# Patient Record
Sex: MALE | Race: BLACK OR AFRICAN AMERICAN | Employment: OTHER | URBAN - METROPOLITAN AREA
[De-identification: names, ages, dates, MRNs, and addresses within clinical notes are randomized per-mention and may not be internally consistent; named-entity substitution may affect disease eponyms.]

---

## 2017-01-26 DIAGNOSIS — E55.9 VITAMIN D DEFICIENCY: Primary | ICD-10-CM

## 2017-01-26 NOTE — TELEPHONE ENCOUNTER
Vitamin D 50,000 IU Caps      Last Written Prescription Date: 9/30/16  Last Fill Quantity: 12,  # refills: 3   Last Office Visit with [facility], P or [facility] prescribing provider: 9/30/16

## 2017-01-27 DIAGNOSIS — E11.39 TYPE 2 DIABETES MELLITUS WITH OTHER DIABETIC OPHTHALMIC COMPLICATION (H): Primary | ICD-10-CM

## 2017-01-27 NOTE — TELEPHONE ENCOUNTER
Contour Next Test Strips 100's      Last Written Prescription Date: 8/24/16  Last Fill Quantity: 1,  # refills: 3   Last Office Visit with [facility], P or [facility] prescribing provider: 9/30/16

## 2017-01-27 NOTE — TELEPHONE ENCOUNTER
Routing refill request to provider for review/approval because:  Drug not on the [facility] refill protocol     Thank you,  Porsche Ortiz RN

## 2017-04-24 ENCOUNTER — TELEPHONE (OUTPATIENT)
Dept: FAMILY MEDICINE | Facility: CLINIC | Age: 67
End: 2017-04-24

## 2017-04-24 NOTE — TELEPHONE ENCOUNTER
Call Regarding Preventive Health Screening Colonoscopy    Attempt 1    Message on voicemail     Comments:       Outreach   Ilana Appiah

## 2017-05-01 NOTE — TELEPHONE ENCOUNTER
"05/01/17      [facility] completed else where MARICHUY Jamil.    Nell \"Мария\" Rocky  Central Scheduler    "

## 2017-09-01 ENCOUNTER — TRANSFERRED RECORDS (OUTPATIENT)
Dept: HEALTH INFORMATION MANAGEMENT | Facility: CLINIC | Age: 67
End: 2017-09-01

## 2017-10-03 ENCOUNTER — OFFICE VISIT (OUTPATIENT)
Dept: FAMILY MEDICINE | Facility: CLINIC | Age: 67
End: 2017-10-03
Payer: MEDICARE

## 2017-10-03 VITALS
HEIGHT: 67 IN | TEMPERATURE: 97.2 F | BODY MASS INDEX: 24.34 KG/M2 | DIASTOLIC BLOOD PRESSURE: 83 MMHG | HEART RATE: 81 BPM | SYSTOLIC BLOOD PRESSURE: 133 MMHG | OXYGEN SATURATION: 99 % | WEIGHT: 155.1 LBS

## 2017-10-03 DIAGNOSIS — E11.9 CONTROLLED TYPE 2 DIABETES MELLITUS WITHOUT COMPLICATION, WITHOUT LONG-TERM CURRENT USE OF INSULIN (H): ICD-10-CM

## 2017-10-03 DIAGNOSIS — Z13.89 SCREENING FOR DIABETIC PERIPHERAL NEUROPATHY: ICD-10-CM

## 2017-10-03 DIAGNOSIS — Z00.00 MEDICARE ANNUAL WELLNESS VISIT, SUBSEQUENT: Primary | ICD-10-CM

## 2017-10-03 DIAGNOSIS — I10 ESSENTIAL HYPERTENSION WITH GOAL BLOOD PRESSURE LESS THAN 140/90: ICD-10-CM

## 2017-10-03 LAB — HBA1C MFR BLD: 6.7 % (ref 4.3–6)

## 2017-10-03 PROCEDURE — G0439 PPPS, SUBSEQ VISIT: HCPCS | Performed by: FAMILY MEDICINE

## 2017-10-03 PROCEDURE — 36415 COLL VENOUS BLD VENIPUNCTURE: CPT | Performed by: FAMILY MEDICINE

## 2017-10-03 PROCEDURE — 82043 UR ALBUMIN QUANTITATIVE: CPT | Performed by: FAMILY MEDICINE

## 2017-10-03 PROCEDURE — 83036 HEMOGLOBIN GLYCOSYLATED A1C: CPT | Performed by: FAMILY MEDICINE

## 2017-10-03 PROCEDURE — 80053 COMPREHEN METABOLIC PANEL: CPT | Performed by: FAMILY MEDICINE

## 2017-10-03 PROCEDURE — 99207 C FOOT EXAM  NO CHARGE: CPT | Performed by: FAMILY MEDICINE

## 2017-10-03 PROCEDURE — 80061 LIPID PANEL: CPT | Performed by: FAMILY MEDICINE

## 2017-10-03 NOTE — PROGRESS NOTES
SUBJECTIVE:   Alec Hanson is a 67 year old male who presents for Preventive Visit.    Are you in the first 12 months of your Medicare Part B coverage?  No    Healthy Habits:    Do you get at least three servings of calcium containing foods daily (dairy, green leafy vegetables, etc.)? no, taking calcium and/or vitamin D supplement: no    Amount of exercise or daily activities, outside of work: 0 day(s) per week x3 months     Problems taking medications regularly not applicable    Medication side effects: No    Have you had an eye exam in the past two years? Yes 9/5/2017    Do you see a dentist twice per year? yes    Do you have sleep apnea, excessive snoring or daytime drowsiness?no- has trouble sleeping at night     COGNITIVE SCREEN  1) Repeat 3 items (Banana, Sunrise, Chair)    2) Clock draw: NORMAL  3) 3 item recall: Recalls 3 objects  Results: 3 items recalled: COGNITIVE IMPAIRMENT LESS LIKELY    Mini-CogTM Copyright S Jorje. Licensed by the author for use in [facility]; reprinted with permission (thong@[facility]). All rights reserved.          Eye exam with ophthalmology on this date: 9/5/2017          Diabetes Follow-up      Patient is checking blood sugars: not at all    Diabetic concerns: None     Symptoms of hypoglycemia (low blood sugar): none     Paresthesias (numbness or burning in feet) or sores: No     Date of last diabetic eye exam: 9/2017    Hyperlipidemia Follow-Up      Rate your low fat/cholesterol diet?: good    Taking statin?  No, he declined     Other lipid medications/supplements?:  none    Hypertension Follow-up      Outpatient blood pressures are not being checked.    Low Salt Diet: no added salt              Reviewed and updated as needed this visit by clinical staffTobacco  Allergies  Meds  Problems         Reviewed and updated as needed this visit by Provider  Tobacco  Allergies  Meds  Problems        Social History   Substance Use Topics     Smoking status: Never  Smoker     Smokeless tobacco: Never Used     Alcohol use No       The patient does not drink >3 drinks per day nor >7 drinks per week.    Today's PHQ-2 Score:   PHQ-2 ( 1999 [employer]) 10/3/2017 9/30/2016   Q1: Little interest or pleasure in doing things 0 0   Q2: Feeling down, depressed or hopeless 0 0   PHQ-2 Score 0 0         Do you feel safe in your environment - Yes    Do you have a Health Care Directive?: No: Advance care planning was reviewed with patient; patient declined at this time.      Current providers sharing in care for this patient include: Patient Care Team:  Riaz Torres MD as PCP - General (Family Practice)  Yael Hanks MD as MD (Ophthalmology)  Riaz Torres MD as MD (Family Practice)  Aravind Gloria MD as MD (Neurology)      Hearing impairment: Yes    Ability to successfully perform activities of daily living: Yes, no assistance needed     Fall risk:      Home safety:  lack of grab bars in the bathroom      The following health maintenance items are reviewed in Epic and correct as of today:  Health Maintenance   Topic Date Due     ADVANCE DIRECTIVE PLANNING Q5 YRS  09/10/2003     AORTIC ANEURYSM SCREENING (SYSTEM ASSIGNED)  09/10/2013     A1C Q6 MO  03/30/2017     EYE EXAM Q1 YEAR  06/01/2017     INFLUENZA VACCINE (SYSTEM ASSIGNED)  09/01/2017     FOOT EXAM Q1 YEAR  09/30/2017     CREATININE Q1 YEAR  09/30/2017     LIPID MONITORING Q1 YEAR  09/30/2017     MICROALBUMIN Q1 YEAR  09/30/2017     FALL RISK ASSESSMENT  09/30/2017     TSH W/ FREE T4 REFLEX Q2 YEAR  09/30/2018     TETANUS IMMUNIZATION (SYSTEM ASSIGNED)  07/31/2024     COLON CANCER SCREEN (SYSTEM ASSIGNED)  01/01/2026     PNEUMOCOCCAL  Completed     HEPATITIS C SCREENING  Completed     Labs reviewed in EPIC    Got flu shot elsewhere    ROS:  Constitutional, HEENT, cardiovascular, pulmonary, gi and gu systems are negative, except as otherwise noted.      OBJECTIVE:   /83  Pulse 81   "Temp 97.2  F (36.2  C) (Oral)  Ht 5' 7.25\" (1.708 m)  Wt 155 lb 1.6 oz (70.4 kg)  SpO2 99%  BMI 24.11 kg/m2 Estimated body mass index is 24.11 kg/(m^2) as calculated from the following:    Height as of this encounter: 5' 7.25\" (1.708 m).    Weight as of this encounter: 155 lb 1.6 oz (70.4 kg).  EXAM:   GENERAL: healthy, alert and no distress  EYES: Eyes grossly normal to inspection, PERRL and conjunctivae and sclerae normal  HENT: ear canals and TM's normal, nose and mouth without ulcers or lesions  NECK: no adenopathy, no asymmetry, masses, or scars and thyroid normal to palpation  RESP: lungs clear to auscultation - no rales, rhonchi or wheezes  CV: regular rate and rhythm, normal S1 S2, no S3 or S4, no murmur, click or rub, no peripheral edema and peripheral pulses strong  ABDOMEN: soft, nontender, no hepatosplenomegaly, no masses and bowel sounds normal   (male): normal male genitalia without lesions or urethral discharge, no hernia  RECTAL (male): normal sphincter tone, no rectal masses, prostate normal size, smooth, nontender without nodules or masses  MS: no gross musculoskeletal defects noted, no edema  NEURO: Normal strength and tone, mentation intact and speech normal  PSYCH: mentation appears normal, affect normal/bright  LYMPH: no cervical, supraclavicular, axillary, or inguinal adenopathy  Diabetic foot exam: normal DP and PT pulses, no trophic changes or ulcerative lesions and normal sensory exam    ASSESSMENT / PLAN:   1. Medicare annual wellness visit, subsequent  Doing well    2. Controlled type 2 diabetes mellitus without complication, without long-term current use of insulin (H)  Well controlled [facility] diet/ exercise  - Albumin Random Urine Quantitative with Creat Ratio  - Comprehensive metabolic panel    3. Screening for diabetic peripheral neuropathy  normal   - FOOT EXAM  NO CHARGE [88721.642]    4. Essential hypertension with goal blood pressure less than 140/90  Well controlled  Off " "meds  - Lipid panel reflex to direct LDL  - Albumin Random Urine Quantitative with Creat Ratio  - Comprehensive metabolic panel    End of Life Planning:  Patient currently has an advanced directive: No.  I have verified the patient's ablity to prepare an advanced directive/make health care decisions.  Literature was provided to assist patient in preparing an advanced directive.    COUNSELING:  Reviewed preventive health counseling, as reflected in patient instructions       Regular exercise       Healthy diet/nutrition       Vision screening       Hearing screening       Dental care       Colon cancer screening       Prostate cancer screening he declined PSA        Estimated body mass index is 24.11 kg/(m^2) as calculated from the following:    Height as of this encounter: 5' 7.25\" (1.708 m).    Weight as of this encounter: 155 lb 1.6 oz (70.4 kg).     reports that he has never smoked. He has never used smokeless tobacco.        Appropriate preventive services were discussed with this patient, including applicable screening as appropriate for cardiovascular disease, diabetes, osteopenia/osteoporosis, and glaucoma.  As appropriate for age/gender, discussed screening for colorectal cancer, prostate cancer, breast cancer, and cervical cancer. Checklist reviewing preventive services available has been given to the patient.    Reviewed patients plan of care and provided an AVS. The Basic Care Plan (routine screening as documented in Health Maintenance) for Alec meets the Care Plan requirement. This Care Plan has been established and reviewed with the Patient.    Counseling Resources:  ATP IV Guidelines  Pooled Cohorts Equation Calculator  Breast Cancer Risk Calculator  FRAX Risk Assessment  ICSI Preventive Guidelines  Dietary Guidelines for Americans, 2010  USDA's MyPlate  ASA Prophylaxis  Lung CA Screening    Riza Torres MD  [facility]  "

## 2017-10-03 NOTE — MR AVS SNAPSHOT
After Visit Summary   10/3/2017    Alec Hanson    MRN: [id number]           Patient Information     Date Of Birth          1950        Visit Information        Provider Department      10/3/2017 8:30 AM Riaz Torres MD [facility]        Today's Diagnoses     Medicare annual wellness visit, subsequent    -  1    Controlled type 2 diabetes mellitus without complication, without long-term current use of insulin (H)        Screening for diabetic peripheral neuropathy        Essential hypertension with goal blood pressure less than 140/90          Care Instructions      Preventive Health Recommendations:       Male Ages 65 and over    Yearly exam:             See your health care provider every year in order to  o   Review health changes.   o   Discuss preventive care.    o   Review your medicines if your doctor has prescribed any.    Talk with your health care provider about whether you should have a test to screen for prostate cancer (PSA).    Every 3 years, have a diabetes test (fasting glucose). If you are at risk for diabetes, you should have this test more often.    Every 5 years, have a cholesterol test. Have this test more often if you are at risk for high cholesterol or heart disease.     Every 10 years, have a colonoscopy. Or, have a yearly FIT test (stool test). These exams will check for colon cancer.    Talk to with your health care provider about screening for Abdominal Aortic Aneurysm if you have a family history of AAA or have a history of smoking.  Shots:     Get a flu shot each year.     Get a tetanus shot every 10 years.     Talk to your doctor about your pneumonia vaccines. There are now two you should receive - Pneumovax (PPSV 23) and Prevnar (PCV 13).    Talk to your doctor about a shingles vaccine.     Talk to your doctor about the hepatitis B vaccine.  Nutrition:     Eat at least 5 servings of fruits and vegetables each day.     Eat whole-grain  bread, whole-wheat pasta and brown rice instead of white grains and rice.     Talk to your doctor about Calcium and Vitamin D.   Lifestyle    Exercise for at least 150 minutes a week (30 minutes a day, 5 days a week). This will help you control your weight and prevent disease.     Limit alcohol to one drink per day.     No smoking.     Wear sunscreen to prevent skin cancer.     See your dentist every six months for an exam and cleaning.     See your eye doctor every 1 to 2 years to screen for conditions such as glaucoma, macular degeneration and cataracts.          Follow-ups after your visit        Follow-up notes from your care team     Return in about 6 months (around 4/3/2018).      Who to contact     If you have questions or need follow up information about today's clinic visit or your schedule please contact [facility] directly at [phone].  Normal or non-critical lab and imaging results will be communicated to you by Win [employer] Slotshart, letter or phone within 4 business days after the clinic has received the results. If you do not hear from us within 7 days, please contact the clinic through [employer] Sl[employer]art or phone. If you have a critical or abnormal lab result, we will notify you by phone as soon as possible.  Submit refill requests through [email or web address] or call your pharmacy and they will forward the refill request to us. Please allow 3 business days for your refill to be completed.          Additional Information About Your Visit        [email or web address] Information     [email or web address] gives you secure access to your electronic health record. If you see a primary care provider, you can also send messages to your care team and make appointments. If you have questions, please call your primary care clinic.  If you do not have a primary care provider, please call [phone] and they will assist you.        Care EveryWhere ID     This is your Care EveryWhere ID. This could be used by other organizations to access your [facility]  "medical records  [id number]        Your Vitals Were     Pulse Temperature Height Pulse Oximetry BMI (Body Mass Index)       81 97.2  F (36.2  C) (Oral) 5' 7.25\" (1.708 m) 99% 24.11 kg/m2        Blood Pressure from Last 3 Encounters:   10/03/17 133/83   09/30/16 137/72   09/28/15 139/74    Weight from Last 3 Encounters:   10/03/17 155 lb 1.6 oz (70.4 kg)   09/30/16 155 lb 1.6 oz (70.4 kg)   09/28/15 151 lb (68.5 kg)              We Performed the Following     Albumin Random Urine Quantitative with Creat Ratio     Comprehensive metabolic panel     FOOT EXAM  NO CHARGE [25829.114]     HEMOGLOBIN A1C     Lipid panel reflex to direct LDL          Today's Medication Changes          These changes are accurate as of: 10/3/17  9:05 AM.  If you have any questions, ask your nurse or doctor.               Stop taking these medicines if you haven't already. Please contact your care team if you have questions.     gabapentin 300 MG capsule   Commonly known as:  NEURONTIN   Stopped by:  Riaz Torres MD           isoniazid 300 MG tablet   Commonly known as:  NYDRAZID   Stopped by:  Riaz Torres MD           mefloquine 250 MG tablet   Commonly known as:  LARIAM   Stopped by:  Riaz Torres MD           naproxen 500 MG tablet   Commonly known as:  NAPROSYN   Stopped by:  Riaz Torres MD           traZODone 50 MG tablet   Commonly known as:  DESYREL   Stopped by:  Riaz Torres MD           typhoid CR capsule   Commonly known as:  VIVOTIF REI VACCINE   Stopped by:  Riaz Torres MD                    Primary Care Provider Office Phone # Fax #    Riaz Torres [phone]       [address] [facility] [address]  [facility] [address]        Equal Access to Services     [facility] ROSANNE AH: Hadii hemalatha ku hadasho Soomaali, waaxda luqadaha, qaybta kaalmada adeegyada, waxay zhanna caldera. So [facility] [phone].    ATENCIÓN: Si " kelly soria, tiene a davila disposición servicios gratuitos de asistencia lingüística. Panchito mckeon [phone].    We comply with applicable federal civil rights laws and [place] laws. We do not discriminate on the basis of race, color, national origin, age, disability, sex, sexual orientation, or gender identity.            Thank you!     Thank you for [place] [facility]  [place]. Our goal is always to provide you with excellent care. Hearing back from our patients is one way we can continue to improve our services. Please take a few minutes to complete the written survey that you may receive in the mail after your visit with us. Thank you!             Your Updated Medication List - Protect others around you: Learn how to safely use, store and throw away your medicines at www.disposemymeds.org.          This list is accurate as of: 10/3/17  9:05 AM.  Always use your most recent med list.                   Brand Name Dispense Instructions for use Diagnosis    amLODIPine 5 MG tablet    NORVASC    90 tablet    Take 1 tablet (5 mg) by mouth daily    Essential hypertension with goal blood pressure less than 140/90       aspirin 81 MG tablet     90 tablet    Take 1 tablet (81 mg) by mouth daily        blood glucose monitoring lancets     1 Box    Use to test blood sugar 2 times daily or as directed.    Type 2 diabetes mellitus with other diabetic ophthalmic complication       blood glucose monitoring test strip    MIKE CONTOUR NEXT    1 Box    Use to test blood sugar 2 times daily or as directed.    Type 2 diabetes mellitus with other diabetic ophthalmic complication       cholecalciferol 88167 UNITS capsule    VITAMIN D3    12 capsule    Take 1 capsule (50,000 Units) by mouth once a week    Vitamin D deficiency       losartan 25 MG tablet    COZAAR    45 tablet    Take 0.5 tablets (12.5 mg) by mouth daily    Essential hypertension with goal blood pressure less than 140/90, Type 2 diabetes mellitus  with other diabetic ophthalmic complication       pravastatin 10 MG tablet    PRAVACHOL    90 tablet    Take 1 tablet (10 mg) by mouth daily    Type 2 diabetes mellitus with other diabetic ophthalmic complication

## 2017-10-03 NOTE — NURSING NOTE
"Chief Complaint   Patient presents with     Medicare Visit     Diabetes       Initial /90 (BP Location: Right arm, Patient Position: Chair, Cuff Size: Adult Regular)  Pulse 81  Temp 97.2  F (36.2  C) (Oral)  Ht 5' 7.25\" (1.708 m)  Wt 155 lb 1.6 oz (70.4 kg)  SpO2 99%  BMI 24.11 kg/m2 Estimated body mass index is 24.11 kg/(m^2) as calculated from the following:    Height as of this encounter: 5' 7.25\" (1.708 m).    Weight as of this encounter: 155 lb 1.6 oz (70.4 kg).  Medication Reconciliation: complete     Hyun Harden CMA    "

## 2017-10-04 LAB
ALBUMIN SERPL-MCNC: 4.3 G/DL (ref 3.4–5)
ALP SERPL-CCNC: 53 U/L (ref 40–150)
ALT SERPL W P-5'-P-CCNC: 28 U/L (ref 0–70)
ANION GAP SERPL CALCULATED.3IONS-SCNC: 9 MMOL/L (ref 3–14)
AST SERPL W P-5'-P-CCNC: 20 U/L (ref 0–45)
BILIRUB SERPL-MCNC: 0.7 MG/DL (ref 0.2–1.3)
BUN SERPL-MCNC: 14 MG/DL (ref 7–30)
CALCIUM SERPL-MCNC: 9.6 MG/DL (ref 8.5–10.1)
CHLORIDE SERPL-SCNC: 101 MMOL/L (ref 94–109)
CHOLEST SERPL-MCNC: 268 MG/DL
CO2 SERPL-SCNC: 26 MMOL/L (ref 20–32)
CREAT SERPL-MCNC: 1.18 MG/DL (ref 0.66–1.25)
CREAT UR-MCNC: 133 MG/DL
GFR SERPL CREATININE-BSD FRML MDRD: 61 ML/MIN/1.7M2
GLUCOSE SERPL-MCNC: 138 MG/DL (ref 70–99)
HDLC SERPL-MCNC: 42 MG/DL
LDLC SERPL CALC-MCNC: 169 MG/DL
MICROALBUMIN UR-MCNC: <5 MG/L
MICROALBUMIN/CREAT UR: NORMAL MG/G CR (ref 0–17)
NONHDLC SERPL-MCNC: 226 MG/DL
POTASSIUM SERPL-SCNC: 4.2 MMOL/L (ref 3.4–5.3)
PROT SERPL-MCNC: 8.5 G/DL (ref 6.8–8.8)
SODIUM SERPL-SCNC: 136 MMOL/L (ref 133–144)
TRIGL SERPL-MCNC: 285 MG/DL

## 2018-04-10 ENCOUNTER — TELEPHONE (OUTPATIENT)
Dept: FAMILY MEDICINE | Facility: CLINIC | Age: 68
End: 2018-04-10

## 2018-04-10 DIAGNOSIS — E11.9 TYPE 2 DIABETES MELLITUS WITHOUT COMPLICATION, WITHOUT LONG-TERM CURRENT USE OF INSULIN (H): Primary | ICD-10-CM

## 2018-04-10 DIAGNOSIS — E55.9 VITAMIN D DEFICIENCY: ICD-10-CM

## 2018-04-10 NOTE — TELEPHONE ENCOUNTER
Dr. Torres    See pt message    Spoke with pt and he has appointment scheduled with you for the pain and DM check on 4/17    Labs cued, are there others you wish    Linda Domínguez RN   [facility]

## 2018-04-10 NOTE — TELEPHONE ENCOUNTER
"Alec called this morning to set up an appointment with Dr. Torres as he is having pain in his \"bones\" and wanted to come in earlier for a fasting A1C. Does he also need a follow up on his diabetes? Please call him at [phone].  "

## 2018-04-16 NOTE — PROGRESS NOTES
SUBJECTIVE:   Alec Hanson is a 67 year old male who presents to clinic today for the following health issues:      Diabetes Follow-up      Patient is checking blood sugars: not at all    Diabetic concerns: None     Symptoms of hypoglycemia (low blood sugar): none     Paresthesias (numbness or burning in feet) or sores: No         BP Readings from Last 2 Encounters:   10/03/17 133/83   09/30/16 137/72     Hemoglobin A1C (%)   Date Value   10/03/2017 6.7 (H)   09/30/2016 6.4 (H)     LDL Cholesterol Calculated (mg/dL)   Date Value   10/03/2017 169 (H)   09/30/2016 102 (H)       Amount of exercise or physical activity: 2-3 days/week for an average of 30-45 minutes    Problems taking medications regularly: he stopped them     got muscle pain on ststin    Medication side effects: muscle aches    Diet: regular (no restrictions)      Other questions/oncerns: Had a sharp pain in head that woke him up from his sleep on 4/9 around 1130pm- Gasping for air, dizziness/lightheadedness, off balance, fatigue     Hyperlipidemia Follow-Up      Rate your low fat/cholesterol diet?: good    Taking statin?  Stopped due to muscle pain    Other lipid medications/supplements?:  none    Hypertension Follow-up      Outpatient blood pressures are not being checked.    Low Salt Diet: no added salt      Problem list and histories reviewed & adjusted, as indicated.  Additional history: as documented    Labs reviewed in EPIC    Reviewed and updated as needed this visit by clinical staff       Reviewed and updated as needed this visit by Provider         ROS:  Constitutional, HEENT, cardiovascular, pulmonary, gi and gu systems are negative, except as otherwise noted.    OBJECTIVE:     /84  Pulse 67  Temp 97.7  F (36.5  C) (Oral)  Wt 154 lb 3.2 oz (69.9 kg)  SpO2 99%  BMI 23.97 kg/m2  Body mass index is 23.97 kg/(m^2).  GENERAL: healthy, alert and no distress  NECK: no adenopathy, no asymmetry, masses, or scars and thyroid normal to  palpation  RESP: lungs clear to auscultation - no rales, rhonchi or wheezes  CV: regular rate and rhythm, normal S1 S2, no S3 or S4, no murmur, click or rub, no peripheral edema and peripheral pulses strong  ABDOMEN: soft, nontender, no hepatosplenomegaly, no masses and bowel sounds normal  MS: no gross musculoskeletal defects noted, no edema  SKIN: no suspicious lesions or rashes  PSYCH: mentation appears normal, affect normal/bright  Diabetic foot exam: normal DP and PT pulses, no trophic changes or ulcerative lesions and normal sensory exam  Neuro exam normal   Diagnostic Test Results:  Results for orders placed or performed in visit on 04/17/18   HEMOGLOBIN A1C   Result Value Ref Range    Hemoglobin A1C 6.6 (H) 0 - 5.6 %   EKG normal     ASSESSMENT/PLAN:             1. Type 2 diabetes mellitus without complication, without long-term current use of insulin (H)  Well controlled with diet/ exercise  - HEMOGLOBIN A1C  - **Comprehensive metabolic panel FUTURE 2mo  - Albumin Random Urine Quantitative with Creat Ratio    2. Vitamin D deficiency  recheck  - **Vitamin D Deficiency FUTURE anytime    3. Essential hypertension with goal blood pressure less than 140/90  Not controlled, likely leading to [place]   resume medications   - amLODIPine (NORVASC) 5 MG tablet; Take 1 tablet (5 mg) by mouth daily  Dispense: 90 tablet; Refill: 3  - losartan (COZAAR) 25 MG tablet; Take 0.5 tablets (12.5 mg) by mouth daily  Dispense: 45 tablet; Refill: 1  Take asa  Regular exercise  Follow up in 1 month.     Riaz Torres MD  [facility] [place]

## 2018-04-17 ENCOUNTER — OFFICE VISIT (OUTPATIENT)
Dept: FAMILY MEDICINE | Facility: CLINIC | Age: 68
End: 2018-04-17
Payer: MEDICARE

## 2018-04-17 ENCOUNTER — TRANSFERRED RECORDS (OUTPATIENT)
Dept: HEALTH INFORMATION MANAGEMENT | Facility: CLINIC | Age: 68
End: 2018-04-17

## 2018-04-17 VITALS
TEMPERATURE: 97.7 F | DIASTOLIC BLOOD PRESSURE: 84 MMHG | OXYGEN SATURATION: 99 % | BODY MASS INDEX: 23.97 KG/M2 | SYSTOLIC BLOOD PRESSURE: 156 MMHG | WEIGHT: 154.2 LBS | HEART RATE: 67 BPM

## 2018-04-17 DIAGNOSIS — E11.9 TYPE 2 DIABETES MELLITUS WITHOUT COMPLICATION, WITHOUT LONG-TERM CURRENT USE OF INSULIN (H): ICD-10-CM

## 2018-04-17 DIAGNOSIS — I10 ESSENTIAL HYPERTENSION WITH GOAL BLOOD PRESSURE LESS THAN 140/90: Primary | ICD-10-CM

## 2018-04-17 DIAGNOSIS — E55.9 VITAMIN D DEFICIENCY: ICD-10-CM

## 2018-04-17 LAB — HBA1C MFR BLD: 6.6 % (ref 0–5.6)

## 2018-04-17 PROCEDURE — 82306 VITAMIN D 25 HYDROXY: CPT | Performed by: FAMILY MEDICINE

## 2018-04-17 PROCEDURE — 82043 UR ALBUMIN QUANTITATIVE: CPT | Performed by: FAMILY MEDICINE

## 2018-04-17 PROCEDURE — 93000 ELECTROCARDIOGRAM COMPLETE: CPT | Performed by: FAMILY MEDICINE

## 2018-04-17 PROCEDURE — 80053 COMPREHEN METABOLIC PANEL: CPT | Performed by: FAMILY MEDICINE

## 2018-04-17 PROCEDURE — 99214 OFFICE O/P EST MOD 30 MIN: CPT | Performed by: FAMILY MEDICINE

## 2018-04-17 PROCEDURE — 99207 C FOOT EXAM  NO CHARGE: CPT | Performed by: FAMILY MEDICINE

## 2018-04-17 PROCEDURE — 83036 HEMOGLOBIN GLYCOSYLATED A1C: CPT | Performed by: FAMILY MEDICINE

## 2018-04-17 PROCEDURE — 36415 COLL VENOUS BLD VENIPUNCTURE: CPT | Performed by: FAMILY MEDICINE

## 2018-04-17 RX ORDER — AMLODIPINE BESYLATE 5 MG/1
5 TABLET ORAL DAILY
Qty: 90 TABLET | Refills: 3 | Status: SHIPPED | OUTPATIENT
Start: 2018-04-17 | End: 2019-07-05

## 2018-04-17 RX ORDER — LOSARTAN POTASSIUM 25 MG/1
12.5 TABLET ORAL DAILY
Qty: 45 TABLET | Refills: 1 | Status: SHIPPED | OUTPATIENT
Start: 2018-04-17 | End: 2018-06-08

## 2018-04-17 NOTE — MR AVS SNAPSHOT
After Visit Summary   4/17/2018    Alec Hanson    MRN: [id number]           Patient Information     Date Of Birth          1950        Visit Information        Provider Department      4/17/2018 2:30 PM iRaz Torres MD [facility] [place]        Today's Diagnoses     Essential hypertension with goal blood pressure less than 140/90    -  1    Type 2 diabetes mellitus without complication, without long-term current use of insulin (H)        Vitamin D deficiency           Follow-ups after your visit        Who to contact     If you have questions or need follow up information about today's clinic visit or your schedule please contact [facility] directly at [phone].  Normal or non-critical lab and imaging results will be communicated to you by MyChart, letter or phone within 4 business days after the clinic has received the results. If you do not hear from us within 7 days, please contact the clinic through [employer]el Mail[employer] or phone. If you have a critical or abnormal lab result, we will notify you by phone as soon as possible.  Submit refill requests through righTune or call your pharmacy and they will forward the refill request to us. Please allow 3 business days for your refill to be completed.          Additional Information About Your Visit        MyChart Information     righTune gives you secure access to your electronic health record. If you see a primary care provider, you can also send messages to your care team and make appointments. If you have questions, please call your primary care clinic.  If you do not have a primary care provider, please call [phone] and they will assist you.        Care EveryWhere ID     This is your Care EveryWhere ID. This could be used by other organizations to access your [place] medical records  [id number]        Your Vitals Were     Pulse Temperature Pulse Oximetry BMI (Body Mass Index)          67 97.7  F (36.5  C) (Oral)  99% 23.97 kg/m2         Blood Pressure from Last 3 Encounters:   04/17/18 156/84   10/03/17 133/83   09/30/16 137/72    Weight from Last 3 Encounters:   04/17/18 154 lb 3.2 oz (69.9 kg)   10/03/17 155 lb 1.6 oz (70.4 kg)   09/30/16 155 lb 1.6 oz (70.4 kg)              We Performed the Following     **Comprehensive metabolic panel FUTURE 2mo     **Vitamin D Deficiency FUTURE anytime     Albumin Random Urine Quantitative with Creat Ratio     EKG 12-lead complete w/read - Clinics     FOOT EXAM     HEMOGLOBIN A1C          Where to get your medicines      These medications were sent to [email or web address] [address] [address] [address]     Phone:  [phone]     amLODIPine 5 MG tablet    losartan 25 MG tablet          Primary Care Provider Office Phone # Fax #    Riaz Giovany Torres [phone]       [address] [facility] [address]  [facility] [address]        Equal Access to Services     [facility] : Hadii aad ku hadasho Soomaali, waaxda luqadaha, qaybta kaalmada adegeraldyarom, emma kent . So [facility] [phone].    ATENCIÓN: Si habla español, tiene a davila disposición servicios gratuitos de asistencia lingüística. Panchito al [phone].    We comply with applicable federal civil rights laws and [place] laws. We do not discriminate on the basis of race, color, national origin, age, disability, sex, sexual orientation, or gender identity.            Thank you!     Thank you for choosing [facility]  for your care. Our goal is always to provide you with excellent care. Hearing back from our patients is one way we can continue to improve our services. Please take a few minutes to complete the written survey that you may receive in the mail after your visit with us. Thank you!             Your Updated Medication List - Protect others around you: Learn how to safely use, store and throw away your  medicines at www.disposemymeds.org.          This list is accurate as of 4/17/18  4:34 PM.  Always use your most recent med list.                   Brand Name Dispense Instructions for use Diagnosis    amLODIPine 5 MG tablet    NORVASC    90 tablet    Take 1 tablet (5 mg) by mouth daily    Essential hypertension with goal blood pressure less than 140/90       aspirin 81 MG tablet     90 tablet    Take 1 tablet (81 mg) by mouth daily        blood glucose monitoring lancets     1 Box    Use to test blood sugar 2 times daily or as directed.    Type 2 diabetes mellitus with other diabetic ophthalmic complication       blood glucose monitoring test strip    MIKE CONTOUR NEXT    1 Box    Use to test blood sugar 2 times daily or as directed.    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

## 2018-04-18 LAB
ALBUMIN SERPL-MCNC: 4 G/DL (ref 3.4–5)
ALP SERPL-CCNC: 54 U/L (ref 40–150)
ALT SERPL W P-5'-P-CCNC: 27 U/L (ref 0–70)
ANION GAP SERPL CALCULATED.3IONS-SCNC: 2 MMOL/L (ref 3–14)
AST SERPL W P-5'-P-CCNC: 17 U/L (ref 0–45)
BILIRUB SERPL-MCNC: 0.6 MG/DL (ref 0.2–1.3)
BUN SERPL-MCNC: 9 MG/DL (ref 7–30)
CALCIUM SERPL-MCNC: 9.1 MG/DL (ref 8.5–10.1)
CHLORIDE SERPL-SCNC: 104 MMOL/L (ref 94–109)
CO2 SERPL-SCNC: 31 MMOL/L (ref 20–32)
CREAT SERPL-MCNC: 1 MG/DL (ref 0.66–1.25)
DEPRECATED CALCIDIOL+CALCIFEROL SERPL-MC: 53 UG/L (ref 20–75)
GFR SERPL CREATININE-BSD FRML MDRD: 74 ML/MIN/1.7M2
GLUCOSE SERPL-MCNC: 127 MG/DL (ref 70–99)
POTASSIUM SERPL-SCNC: 4.8 MMOL/L (ref 3.4–5.3)
PROT SERPL-MCNC: 7.7 G/DL (ref 6.8–8.8)
SODIUM SERPL-SCNC: 137 MMOL/L (ref 133–144)

## 2018-04-19 LAB
CREAT UR-MCNC: 48 MG/DL
MICROALBUMIN UR-MCNC: <5 MG/L
MICROALBUMIN/CREAT UR: NORMAL MG/G CR (ref 0–17)

## 2018-06-08 ENCOUNTER — OFFICE VISIT (OUTPATIENT)
Dept: FAMILY MEDICINE | Facility: CLINIC | Age: 68
End: 2018-06-08
Payer: MEDICARE

## 2018-06-08 VITALS
OXYGEN SATURATION: 99 % | BODY MASS INDEX: 24.25 KG/M2 | TEMPERATURE: 97.8 F | HEART RATE: 70 BPM | DIASTOLIC BLOOD PRESSURE: 76 MMHG | SYSTOLIC BLOOD PRESSURE: 143 MMHG | RESPIRATION RATE: 16 BRPM | WEIGHT: 156 LBS

## 2018-06-08 DIAGNOSIS — J06.9 VIRAL URI WITH COUGH: ICD-10-CM

## 2018-06-08 DIAGNOSIS — R35.0 URINARY FREQUENCY: ICD-10-CM

## 2018-06-08 DIAGNOSIS — Z12.5 ENCOUNTER FOR SCREENING FOR MALIGNANT NEOPLASM OF PROSTATE: ICD-10-CM

## 2018-06-08 DIAGNOSIS — I10 ESSENTIAL HYPERTENSION WITH GOAL BLOOD PRESSURE LESS THAN 140/90: Primary | ICD-10-CM

## 2018-06-08 DIAGNOSIS — E11.9 CONTROLLED TYPE 2 DIABETES MELLITUS WITHOUT COMPLICATION, WITHOUT LONG-TERM CURRENT USE OF INSULIN (H): ICD-10-CM

## 2018-06-08 LAB
ALBUMIN UR-MCNC: NEGATIVE MG/DL
APPEARANCE UR: CLEAR
BILIRUB UR QL STRIP: NEGATIVE
COLOR UR AUTO: YELLOW
GLUCOSE UR STRIP-MCNC: NEGATIVE MG/DL
HGB UR QL STRIP: NEGATIVE
KETONES UR STRIP-MCNC: NEGATIVE MG/DL
LEUKOCYTE ESTERASE UR QL STRIP: NEGATIVE
NITRATE UR QL: NEGATIVE
PH UR STRIP: 6.5 PH (ref 5–7)
PSA SERPL-ACNC: 0.64 UG/L (ref 0–4)
SOURCE: NORMAL
SP GR UR STRIP: <=1.005 (ref 1–1.03)
TSH SERPL DL<=0.005 MIU/L-ACNC: 2.55 MU/L (ref 0.4–4)
UROBILINOGEN UR STRIP-ACNC: 0.2 EU/DL (ref 0.2–1)

## 2018-06-08 PROCEDURE — 36415 COLL VENOUS BLD VENIPUNCTURE: CPT | Performed by: FAMILY MEDICINE

## 2018-06-08 PROCEDURE — G0103 PSA SCREENING: HCPCS | Performed by: FAMILY MEDICINE

## 2018-06-08 PROCEDURE — 99214 OFFICE O/P EST MOD 30 MIN: CPT | Performed by: FAMILY MEDICINE

## 2018-06-08 PROCEDURE — 81003 URINALYSIS AUTO W/O SCOPE: CPT | Performed by: FAMILY MEDICINE

## 2018-06-08 PROCEDURE — 84443 ASSAY THYROID STIM HORMONE: CPT | Performed by: FAMILY MEDICINE

## 2018-06-08 RX ORDER — LOSARTAN POTASSIUM 25 MG/1
25 TABLET ORAL DAILY
Qty: 90 TABLET | Refills: 1 | Status: SHIPPED | OUTPATIENT
Start: 2018-06-08 | End: 2019-07-05

## 2018-06-08 NOTE — MR AVS SNAPSHOT
After Visit Summary   6/8/2018    Alec Hanson    MRN: [id number]           Patient Information     Date Of Birth          1950        Visit Information        Provider Department      6/8/2018 9:00 AM Riaz Torres MD [facility] [place]        Today's Diagnoses     Essential hypertension with goal blood pressure less than 140/90    -  1    Controlled type 2 diabetes mellitus without complication, without long-term current use of insulin (H)        Urinary frequency        Encounter for screening for malignant neoplasm of prostate         Viral URI with cough           Follow-ups after your visit        Who to contact     If you have questions or need follow up information about today's clinic visit or your schedule please contact [facility] directly at [phone].  Normal or non-critical lab and imaging results will be communicated to you by Runic Gameshart, letter or phone within 4 business days after the clinic has received the results. If you do not hear from us within 7 days, please contact the clinic through Run[employer] Games[employer] or phone. If you have a critical or abnormal lab result, we will notify you by phone as soon as possible.  Submit refill requests through FOBO or call your pharmacy and they will forward the refill request to us. Please allow 3 business days for your refill to be completed.          Additional Information About Your Visit        MyChart Information     FOBO gives you secure access to your electronic health record. If you see a primary care provider, you can also send messages to your care team and make appointments. If you have questions, please call your primary care clinic.  If you do not have a primary care provider, please call [phone] and they will assist you.        Care EveryWhere ID     This is your Care EveryWhere ID. This could be used by other organizations to access your [place] medical records  [id number]        Your Vitals  Were     Pulse Temperature Respirations Pulse Oximetry BMI (Body Mass Index)       70 97.8  F (36.6  C) (Oral) 16 99% 24.25 kg/m2        Blood Pressure from Last 3 Encounters:   06/08/18 143/76   04/17/18 156/84   10/03/17 133/83    Weight from Last 3 Encounters:   06/08/18 156 lb (70.8 kg)   04/17/18 154 lb 3.2 oz (69.9 kg)   10/03/17 155 lb 1.6 oz (70.4 kg)              We Performed the Following     *UA reflex to Microscopic     PSA, screen     TSH with free T4 reflex          Today's Medication Changes          These changes are accurate as of 6/8/18 10:24 AM.  If you have any questions, ask your nurse or doctor.               These medicines have changed or have updated prescriptions.        Dose/Directions    losartan 25 MG tablet   Commonly known as:  COZAAR   This may have changed:  how much to take   Used for:  Essential hypertension with goal blood pressure less than 140/90, Controlled type 2 diabetes mellitus without complication, without long-term current use of insulin (H)   Changed by:  Riaz Torres MD        Dose:  25 mg   Take 1 tablet (25 mg) by mouth daily   Quantity:  90 tablet   Refills:  1            Where to get your medicines      These medications were sent to [facility] [address] [place] [address] AT [address] [address]     Phone:  [phone]     losartan 25 MG tablet                Primary Care Provider Office Phone # Fax #    Riaz Torres [phone]       [address] [address]        Equal Access to Services     [facility] : Hadii hemalatha ku hadasho Soomaali, waaxda luqadaha, qaybta kaalmada adeegyarom, emma caldera. So [facility] [phone].    ATENCIÓN: Si habla español, tiene a davila disposición servicios gratuitos de asistencia lingüística. Llame al [phone].    We comply with applicable federal civil rights laws and [place] laws. We  do not discriminate on the basis of race, color, national origin, age, disability, sex, sexual orientation, or gender identity.            Thank you!     Thank you for choosing [facility]  for your care. Our goal is always to provide you with excellent care. Hearing back from our patients is one way we can continue to improve our services. Please take a few minutes to complete the written survey that you may receive in the mail after your visit with us. Thank you!             Your Updated Medication List - Protect others around you: Learn how to safely use, store and throw away your medicines at www.disposemymeds.org.          This list is accurate as of 6/8/18 10:24 AM.  Always use your most recent med list.                   Brand Name Dispense Instructions for use Diagnosis    amLODIPine 5 MG tablet    NORVASC    90 tablet    Take 1 tablet (5 mg) by mouth daily    Essential hypertension with goal blood pressure less than 140/90       aspirin 81 MG tablet     90 tablet    Take 1 tablet (81 mg) by mouth daily        blood glucose monitoring lancets     1 Box    Use to test blood sugar 2 times daily or as directed.    Type 2 diabetes mellitus with other diabetic ophthalmic complication       blood glucose monitoring test strip    MIKE CONTOUR NEXT    1 Box    Use to test blood sugar 2 times daily or as directed.    Type 2 diabetes mellitus with other diabetic ophthalmic complication       cholecalciferol 20557 units capsule    VITAMIN D3    12 capsule    Take 1 capsule (50,000 Units) by mouth once a week    Vitamin D deficiency       losartan 25 MG tablet    COZAAR    90 tablet    Take 1 tablet (25 mg) by mouth daily    Essential hypertension with goal blood pressure less than 140/90, Controlled type 2 diabetes mellitus without complication, without long-term current use of insulin (H)       pravastatin 10 MG tablet    PRAVACHOL    90 tablet    Take 1 tablet (10 mg) by mouth daily    Type 2 diabetes  mellitus with other diabetic ophthalmic complication

## 2018-06-08 NOTE — PROGRESS NOTES
SUBJECTIVE:   Alec Hanson is a 67 year old male who presents to clinic today for the following health issues:    Hypertension Follow-up      Outpatient blood pressures are being checked at home.  Results are 120//70.    Low Salt Diet: low salt      Amount of exercise or physical activity: 6-7 days/week for an average of 15-30 minutes    Problems taking medications regularly: No    Medication side effects: muscle aches and lightheadedness    Diet: low salt       patient did not take losartan for 3 days lightheadedness went away. Patient ran out of the Losartan.      Encounter Diagnoses   Name Primary?     Essential hypertension with goal blood pressure less than 140/90 Yes     Controlled type 2 diabetes mellitus without complication, without long-term current use of insulin (H) has been Well controlled       Urinary frequency , up at night , hard to fall back      Encounter for screening for malignant neoplasm of prostate ., open to screening      Viral URI with cough, gradually improving         Problem list and histories reviewed & adjusted, as indicated.  Additional history: as documented    Labs reviewed in EPIC    Reviewed and updated as needed this visit by clinical staff       Reviewed and updated as needed this visit by Provider         ROS:  Constitutional, HEENT, cardiovascular, pulmonary, gi and gu systems are negative, except as otherwise noted.    OBJECTIVE:     /76  Pulse 70  Temp 97.8  F (36.6  C) (Oral)  Resp 16  Wt 156 lb (70.8 kg)  SpO2 99%  BMI 24.25 kg/m2  Body mass index is 24.25 kg/(m^2).  GENERAL: healthy, alert and no distress  NECK: no adenopathy, no asymmetry, masses, or scars and thyroid normal to palpation  RESP: lungs clear to auscultation - no rales, rhonchi or wheezes  CV: regular rate and rhythm, normal S1 S2, no S3 or S4, no murmur, click or rub, no peripheral edema and peripheral pulses strong  ABDOMEN: soft, nontender, no hepatosplenomegaly, no masses and bowel  sounds normal  NEURO: Normal strength and tone, mentation intact and speech normal  PSYCH: mentation appears normal, affect normal/bright    Diagnostic Test Results:  Results for orders placed or performed in visit on 06/08/18   *UA reflex to Microscopic   Result Value Ref Range    Color Urine Yellow     Appearance Urine Clear     Glucose Urine Negative NEG^Negative mg/dL    Bilirubin Urine Negative NEG^Negative    Ketones Urine Negative NEG^Negative mg/dL    Specific Gravity Urine <=1.005 1.003 - 1.035    Blood Urine Negative NEG^Negative    pH Urine 6.5 5.0 - 7.0 pH    Protein Albumin Urine Negative NEG^Negative mg/dL    Urobilinogen Urine 0.2 0.2 - 1.0 EU/dL    Nitrite Urine Negative NEG^Negative    Leukocyte Esterase Urine Negative NEG^Negative    Source Midstream Urine        ASSESSMENT/PLAN:             1. Essential hypertension with goal blood pressure less than 140/90  Increase to  - losartan (COZAAR) 25 MG tablet; Take 1 tablet (25 mg) by mouth daily  Dispense: 90 tablet; Refill: 1  - TSH with free T4 reflex    2. Controlled type 2 diabetes mellitus without complication, without long-term current use of insulin (H)  Lab Results   Component Value Date    A1C 6.6 04/17/2018    A1C 6.7 10/03/2017    A1C 6.4 09/30/2016    A1C 7.0 09/28/2015    A1C 6.9 06/16/2015       - losartan (COZAAR) 25 MG tablet; Take 1 tablet (25 mg) by mouth daily  Dispense: 90 tablet; Refill: 1    3. Urinary frequency  Rule out renal disease  - PSA, screen  - *UA reflex to Microscopic    4. Encounter for screening for malignant neoplasm of prostate   kj screen  - PSA, screen    5. Viral URI with cough  resolving      See Patient Instructions    Riaz Torres MD  [facility] [place]

## 2018-09-01 ENCOUNTER — TRANSFERRED RECORDS (OUTPATIENT)
Dept: HEALTH INFORMATION MANAGEMENT | Facility: CLINIC | Age: 68
End: 2018-09-01

## 2018-11-16 NOTE — PROGRESS NOTES
"  SUBJECTIVE:   Alec Hanson is a 68 year old male who presents to clinic today for the following health issues:    Diabetes Follow-up    Patient is checking blood sugars: once daily.  Results are as follows:         am - 110-120    Diabetic concerns: None     Symptoms of hypoglycemia (low blood sugar): Rarely      Paresthesias (numbness or burning in feet) or sores: No     Date of last diabetic eye exam: <1 year ago     BP Readings from Last 2 Encounters:   06/08/18 143/76   04/17/18 156/84     Hemoglobin A1C (%)   Date Value   04/17/2018 6.6 (H)   10/03/2017 6.7 (H)     LDL Cholesterol Calculated (mg/dL)   Date Value   10/03/2017 169 (H)   09/30/2016 102 (H)       Diabetes Management Resources    Amount of exercise or physical activity: 6-7 days/week for an average of 30-45 minutes    Problems taking medications regularly: Stopped all medications except aspirin     Medication side effects: none    Diet: Watching carbs, caffeine and salt     Other questions/concerns: right hip/leg pain w/ exercise or driving          Hyperlipidemia Follow-Up      Rate your low fat/cholesterol diet?: good    Taking statin?  Stopped due to did not want to take it    Other lipid medications/supplements?:  none    Hypertension Follow-up      Outpatient blood pressures are not being checked.    Low Salt Diet: no added salt    stoped medication due to ankle swelling  -140/70s  Problem list and histories reviewed & adjusted, as indicated.  Additional history: as documented    Labs reviewed in EPIC    Reviewed and updated as needed this visit by clinical staff       Reviewed and updated as needed this visit by Provider         ROS:  Constitutional, HEENT, cardiovascular, pulmonary, gi and gu systems are negative, except as otherwise noted.    OBJECTIVE:     /71  Pulse 71  Temp 97.7  F (36.5  C) (Oral)  Ht 5' 7.2\" (1.707 m)  Wt 152 lb 8 oz (69.2 kg)  SpO2 97%  BMI 23.74 kg/m2  Body mass index is 23.74 kg/(m^2).  GENERAL: " healthy, alert and no distress  NECK: no adenopathy, no asymmetry, masses, or scars and thyroid normal to palpation  RESP: lungs clear to auscultation - no rales, rhonchi or wheezes  CV: regular rate and rhythm, normal S1 S2, no S3 or S4, no murmur, click or rub, no peripheral edema and peripheral pulses strong  ABDOMEN: soft, nontender, no hepatosplenomegaly, no masses and bowel sounds normal  SKIN: no suspicious lesions or rashes  NEURO: Normal strength and tone, mentation intact and speech normal  Diabetic foot exam: normal DP and PT pulses, no trophic changes or ulcerative lesions and normal sensory exam    Diagnostic Test Results:     Results for orders placed or performed in visit on 11/19/18   **A1C FUTURE anytime   Result Value Ref Range    Hemoglobin A1C 6.8 (H) 0 - 5.6 %        ASSESSMENT/PLAN:             1. Type 2 diabetes mellitus without complication, without long-term current use of insulin (H)  Well controlled   - Lipid panel reflex to direct LDL Fasting  - **A1C FUTURE anytime  - Basic metabolic panel    2. Essential hypertension with goal blood pressure less than 140/90  bordeline high, he declines trying another rmed  - Lipid panel reflex to direct LDL Fasting  - **A1C FUTURE anytime  - Basic metabolic panel    3. Need for prophylactic vaccination and inoculation against influenza  done  - FLU VACCINE, INCREASED ANTIGEN, PRESV FREE, AGE 65+ [89784]  - ADMIN INFLUENZA (For MEDICARE Patients ONLY) []    Regular exercise  Follow up in 6 months.   See Patient Instructions    Riaz Torres MD  [facility] [place]      Injectable Influenza Immunization Documentation    1.  Is the person to be vaccinated sick today?   No    2. Does the person to be vaccinated have an allergy to a component   of the vaccine?   No  Egg Allergy Algorithm Link    3. Has the person to be vaccinated ever had a serious reaction   to influenza vaccine in the past?   No    4. Has the person to be vaccinated  ever had Guillain-Barré syndrome?   No    Form completed by Hyun Harden, [facility]

## 2018-11-19 ENCOUNTER — OFFICE VISIT (OUTPATIENT)
Dept: FAMILY MEDICINE | Facility: CLINIC | Age: 68
End: 2018-11-19
Payer: MEDICARE

## 2018-11-19 VITALS
DIASTOLIC BLOOD PRESSURE: 71 MMHG | WEIGHT: 152.5 LBS | TEMPERATURE: 97.7 F | BODY MASS INDEX: 23.93 KG/M2 | HEART RATE: 71 BPM | HEIGHT: 67 IN | SYSTOLIC BLOOD PRESSURE: 137 MMHG | OXYGEN SATURATION: 97 %

## 2018-11-19 DIAGNOSIS — Z23 NEED FOR PROPHYLACTIC VACCINATION AND INOCULATION AGAINST INFLUENZA: ICD-10-CM

## 2018-11-19 DIAGNOSIS — I10 ESSENTIAL HYPERTENSION WITH GOAL BLOOD PRESSURE LESS THAN 140/90: Primary | ICD-10-CM

## 2018-11-19 DIAGNOSIS — E11.9 TYPE 2 DIABETES MELLITUS WITHOUT COMPLICATION, WITHOUT LONG-TERM CURRENT USE OF INSULIN (H): ICD-10-CM

## 2018-11-19 LAB
ANION GAP SERPL CALCULATED.3IONS-SCNC: 9 MMOL/L (ref 3–14)
BUN SERPL-MCNC: 16 MG/DL (ref 7–30)
CALCIUM SERPL-MCNC: 9.2 MG/DL (ref 8.5–10.1)
CHLORIDE SERPL-SCNC: 104 MMOL/L (ref 94–109)
CHOLEST SERPL-MCNC: 225 MG/DL
CO2 SERPL-SCNC: 26 MMOL/L (ref 20–32)
CREAT SERPL-MCNC: 1.14 MG/DL (ref 0.66–1.25)
GFR SERPL CREATININE-BSD FRML MDRD: 63 ML/MIN/1.7M2
GLUCOSE SERPL-MCNC: 98 MG/DL (ref 70–99)
HBA1C MFR BLD: 6.8 % (ref 0–5.6)
HDLC SERPL-MCNC: 47 MG/DL
LDLC SERPL CALC-MCNC: 160 MG/DL
NONHDLC SERPL-MCNC: 178 MG/DL
POTASSIUM SERPL-SCNC: 4.3 MMOL/L (ref 3.4–5.3)
SODIUM SERPL-SCNC: 139 MMOL/L (ref 133–144)
TRIGL SERPL-MCNC: 92 MG/DL

## 2018-11-19 PROCEDURE — 83036 HEMOGLOBIN GLYCOSYLATED A1C: CPT | Performed by: FAMILY MEDICINE

## 2018-11-19 PROCEDURE — 80061 LIPID PANEL: CPT | Performed by: FAMILY MEDICINE

## 2018-11-19 PROCEDURE — 90662 IIV NO PRSV INCREASED AG IM: CPT | Performed by: FAMILY MEDICINE

## 2018-11-19 PROCEDURE — 80048 BASIC METABOLIC PNL TOTAL CA: CPT | Performed by: FAMILY MEDICINE

## 2018-11-19 PROCEDURE — 36415 COLL VENOUS BLD VENIPUNCTURE: CPT | Performed by: FAMILY MEDICINE

## 2018-11-19 PROCEDURE — G0008 ADMIN INFLUENZA VIRUS VAC: HCPCS | Performed by: FAMILY MEDICINE

## 2018-11-19 PROCEDURE — 99214 OFFICE O/P EST MOD 30 MIN: CPT | Mod: 25 | Performed by: FAMILY MEDICINE

## 2018-11-19 NOTE — MR AVS SNAPSHOT
After Visit Summary   11/19/2018    Alec Hanson    MRN: [id number]           Patient Information     Date Of Birth          1950        Visit Information        Provider Department      11/19/2018 8:30 AM Riaz Torres MD [facility] [place]        Today's Diagnoses     Essential hypertension with goal blood pressure less than 140/90    -  1    Type 2 diabetes mellitus without complication, without long-term current use of insulin (H)        Need for prophylactic vaccination and inoculation against influenza           Follow-ups after your visit        Follow-up notes from your care team     Return in about 6 months (around 5/19/2019).      Who to contact     If you have questions or need follow up information about today's clinic visit or your schedule please contact [facility] [place] at [phone].  Normal or non-critical lab and imaging results will be communicated to you by MyChart, letter or phone within 4 business days after the clinic has received the results. If you do not hear from us within 7 days, please contact the clinic through MyChart or phone. If you have a critical or abnormal lab result, we will notify you by phone as soon as possible.  Submit refill requests through [employer] or call your pharmacy and they will forward the refill request to us. Please allow 3 business days for your refill to be completed.          Additional Information About Your Visit        MyChart Information     [employer] gives you secure access to your electronic health record. If you see a primary care provider, you can also send messages to your care team and make appointments. If you have questions, please call your primary care clinic.  If you do not have a primary care provider, please call [phone] and they will assist you.        Care EveryWhere ID     This is your Care EveryWhere ID. This could be used by other organizations to access your [facility]  "records  [id number]        Your Vitals Were     Pulse Temperature Height Pulse Oximetry BMI (Body Mass Index)       71 97.7  F (36.5  C) (Oral) 5' 7.2\" (1.707 m) 97% 23.74 kg/m2        Blood Pressure from Last 3 Encounters:   11/19/18 137/71   06/08/18 143/76   04/17/18 156/84    Weight from Last 3 Encounters:   11/19/18 152 lb 8 oz (69.2 kg)   06/08/18 156 lb (70.8 kg)   04/17/18 154 lb 3.2 oz (69.9 kg)              We Performed the Following     **A1C FUTURE anytime     ADMIN INFLUENZA (For MEDICARE Patients ONLY) []     Basic metabolic panel     FLU VACCINE, INCREASED ANTIGEN, PRESV FREE, AGE 65+ [56624]     Lipid panel reflex to direct LDL Fasting        Primary Care Provider Office Phone # Fax #    Riaz Torres [phone]       [address] [facility] [address]  [facility] [address]        Equal Access to Services     [facility]: Hadii aad ku hadasho Soomaali, waaxda luqadaha, qaybta kaalmada adeegyada, emma kent . So [facility] [phone].    ATENCIÓN: Si habla español, tiene a davila disposición servicios gratuitos de asistencia lingüística. Llame al [phone].    We comply with applicable federal civil rights laws and [place] laws. We do not discriminate on the basis of race, color, national origin, age, disability, sex, sexual orientation, or gender identity.            Thank you!     Thank you for choosing [facility]  for your care. Our goal is always to provide you with excellent care. Hearing back from our patients is one way we can continue to improve our services. Please take a few minutes to complete the written survey that you may receive in the mail after your visit with us. Thank you!             Your Updated Medication List - Protect others around you: Learn how to safely use, store and throw away your medicines at www.disposemymeds.org.          This list is accurate as of 11/19/18  8:49 AM.  Always use your most recent med " list.                   Brand Name Dispense Instructions for use Diagnosis    amLODIPine 5 MG tablet    NORVASC    90 tablet    Take 1 tablet (5 mg) by mouth daily    Essential hypertension with goal blood pressure less than 140/90       aspirin 81 MG tablet     90 tablet    Take 1 tablet (81 mg) by mouth daily        blood glucose monitoring lancets     1 Box    Use to test blood sugar 2 times daily or as directed.    Type 2 diabetes mellitus with other diabetic ophthalmic complication (H)       blood glucose monitoring test strip    MIKE CONTOUR NEXT    1 Box    Use to test blood sugar 2 times daily or as directed.    Type 2 diabetes mellitus with other diabetic ophthalmic complication (H)       losartan 25 MG tablet    COZAAR    90 tablet    Take 1 tablet (25 mg) by mouth daily    Essential hypertension with goal blood pressure less than 140/90, Controlled type 2 diabetes mellitus without complication, without long-term current use of insulin (H)       pravastatin 10 MG tablet    PRAVACHOL    90 tablet    Take 1 tablet (10 mg) by mouth daily    Type 2 diabetes mellitus with other diabetic ophthalmic complication (H)       vitamin D3 84060 units capsule    CHOLECALCIFEROL    12 capsule    Take 1 capsule (50,000 Units) by mouth once a week    Vitamin D deficiency

## 2019-01-24 ENCOUNTER — HOSPITAL ENCOUNTER (OUTPATIENT)
Dept: GENERAL RADIOLOGY | Facility: CLINIC | Age: 69
Discharge: HOME | End: 2019-01-24
Admitting: FAMILY MEDICINE
Payer: MEDICARE

## 2019-01-24 ENCOUNTER — OFFICE VISIT (OUTPATIENT)
Dept: FAMILY MEDICINE | Facility: CLINIC | Age: 69
End: 2019-01-24
Payer: MEDICARE

## 2019-01-24 VITALS
WEIGHT: 155.8 LBS | TEMPERATURE: 98 F | HEART RATE: 72 BPM | BODY MASS INDEX: 24.26 KG/M2 | OXYGEN SATURATION: 100 % | SYSTOLIC BLOOD PRESSURE: 168 MMHG | DIASTOLIC BLOOD PRESSURE: 82 MMHG

## 2019-01-24 DIAGNOSIS — J98.01 ACUTE BRONCHOSPASM: ICD-10-CM

## 2019-01-24 DIAGNOSIS — I10 HYPERTENSION GOAL BP (BLOOD PRESSURE) < 140/90: ICD-10-CM

## 2019-01-24 DIAGNOSIS — R05.9 COUGH: Primary | ICD-10-CM

## 2019-01-24 DIAGNOSIS — R05.9 COUGH: ICD-10-CM

## 2019-01-24 DIAGNOSIS — E11.9 CONTROLLED TYPE 2 DIABETES MELLITUS WITHOUT COMPLICATION, WITHOUT LONG-TERM CURRENT USE OF INSULIN (H): ICD-10-CM

## 2019-01-24 PROCEDURE — 71046 X-RAY EXAM CHEST 2 VIEWS: CPT

## 2019-01-24 PROCEDURE — 99214 OFFICE O/P EST MOD 30 MIN: CPT | Performed by: FAMILY MEDICINE

## 2019-01-24 RX ORDER — PREDNISONE 20 MG/1
20 TABLET ORAL DAILY
Qty: 7 TABLET | Refills: 0 | Status: SHIPPED | OUTPATIENT
Start: 2019-01-24 | End: 2019-05-07

## 2019-01-24 RX ORDER — METOPROLOL SUCCINATE 25 MG/1
25 TABLET, EXTENDED RELEASE ORAL DAILY
Qty: 90 TABLET | Refills: 3 | Status: SHIPPED | OUTPATIENT
Start: 2019-01-24 | End: 2019-05-07

## 2019-01-24 RX ORDER — CODEINE PHOSPHATE AND GUAIFENESIN 10; 100 MG/5ML; MG/5ML
1-2 SOLUTION ORAL
Qty: 236 ML | Refills: 1 | Status: SHIPPED | OUTPATIENT
Start: 2019-01-24 | End: 2019-05-07

## 2019-01-24 NOTE — PROGRESS NOTES
SUBJECTIVE:   Alec Hanson is a 68 year old male who presents to clinic today for the following health issues:    Acute Illness   Acute illness concerns: Cough   Onset: 3 weeks     Fever: no    Chills/Sweats: no    Headache (location?): YES    Sinus Pressure:no    Conjunctivitis:  no    Ear Pain: no    Rhinorrhea: no    Congestion: no    Sore Throat: YES     Cough: YES-non-productive- usually triggered by talking, keeping him up at night     Wheeze: no    Decreased Appetite: no    Nausea: no    Vomiting: no    Diarrhea:  no    Dysuria/Freq.: no    Fatigue/Achiness: no    Sick/Strep Exposure: none known      Therapies Tried and outcome: Advil this morning for headache- helpful       Non smoker, no history of RAD    Problem list and histories reviewed & adjusted, as indicated.  Additional history: as documented  2) HTN, did not tolerate medications including cozaar or norvasc  Labs reviewed in EPIC    Reviewed and updated as needed this visit by clinical staff       Reviewed and updated as needed this visit by Provider         ROS:  Constitutional, HEENT, cardiovascular, pulmonary, gi and gu systems are negative, except as otherwise noted.    OBJECTIVE:     /85   Pulse 72   Temp 98  F (36.7  C) (Oral)   Wt 70.7 kg (155 lb 12.8 oz)   SpO2 100%   BMI 24.26 kg/m    Body mass index is 24.26 kg/m .  GENERAL: alert, no distress and fatigued  NECK: no adenopathy, no asymmetry, masses, or scars and thyroid normal to palpation  RESP: no rales , no rhonchi and expiratory wheezes bibasilar  CV: regular rate and rhythm, normal S1 S2, no S3 or S4, no murmur, click or rub, no peripheral edema and peripheral pulses strong  ABDOMEN: soft, nontender, no hepatosplenomegaly, no masses and bowel sounds normal  SKIN: no suspicious lesions or rashes  NEURO: Normal strength and tone, mentation intact and speech normal    Diagnostic Test Results:  Results for orders placed or performed in visit on 11/19/18   Lipid panel reflex to  direct LDL Fasting   Result Value Ref Range    Cholesterol 225 (H) <200 mg/dL    Triglycerides 92 <150 mg/dL    HDL Cholesterol 47 >39 mg/dL    LDL Cholesterol Calculated 160 (H) <100 mg/dL    Non HDL Cholesterol 178 (H) <130 mg/dL   **A1C FUTURE anytime   Result Value Ref Range    Hemoglobin A1C 6.8 (H) 0 - 5.6 %   Basic metabolic panel   Result Value Ref Range    Sodium 139 133 - 144 mmol/L    Potassium 4.3 3.4 - 5.3 mmol/L    Chloride 104 94 - 109 mmol/L    Carbon Dioxide 26 20 - 32 mmol/L    Anion Gap 9 3 - 14 mmol/L    Glucose 98 70 - 99 mg/dL    Urea Nitrogen 16 7 - 30 mg/dL    Creatinine 1.14 0.66 - 1.25 mg/dL    GFR Estimate 63 >60 mL/min/1.7m2    GFR Estimate If Black 77 >60 mL/min/1.7m2    Calcium 9.2 8.5 - 10.1 mg/dL       ASSESSMENT/PLAN:             1. Cough  likeyl postviralFollow up in 1 month.- XR Chest 2 Views; Future  - guaiFENesin-codeine (ROBITUSSIN AC) 100-10 MG/5ML solution; Take 5-10 mLs by mouth nightly as needed for cough  Dispense: 236 mL; Refill: 1    2. Acute bronchospasm    - XR Chest 2 Views; Future  - guaiFENesin-codeine (ROBITUSSIN AC) 100-10 MG/5ML solution; Take 5-10 mLs by mouth nightly as needed for cough  Dispense: 236 mL; Refill: 1  - predniSONE (DELTASONE) 20 MG tablet; Take 20 mg by mouth daily for 7 days.  Dispense: 7 tablet; Refill: 0    3. Hypertension goal BP (blood pressure) < 140/90  Not  Well controlled  try  - metoprolol succinate ER (TOPROL-XL) 25 MG 24 hr tablet; Take 1 tablet (25 mg) by mouth daily  Dispense: 90 tablet; Refill: 3  Follow up in 1 month.   4. Controlled type 2 diabetes mellitus without complication, without long-term current use of insulin (H)  Well controlled       Regular exercise  See Patient Instructions    Riaz Torres MD  [facility]

## 2019-03-22 ENCOUNTER — TELEPHONE (OUTPATIENT)
Dept: FAMILY MEDICINE | Facility: CLINIC | Age: 69
End: 2019-03-22

## 2019-03-22 NOTE — TELEPHONE ENCOUNTER
Panel Management Review      Patient has the following on his problem list:     Diabetes    ASA: Not Required     Last A1C  Lab Results   Component Value Date    A1C 6.8 11/19/2018    A1C 6.6 04/17/2018    A1C 6.7 10/03/2017    A1C 6.4 09/30/2016    A1C 7.0 09/28/2015     A1C tested: Passed    Last LDL:    Lab Results   Component Value Date    CHOL 225 11/19/2018     Lab Results   Component Value Date    HDL 47 11/19/2018     Lab Results   Component Value Date     11/19/2018     Lab Results   Component Value Date    TRIG 92 11/19/2018     Lab Results   Component Value Date    CHOLHDLRATIO 2.6 06/16/2015     Lab Results   Component Value Date    NHDL 178 11/19/2018       Is the patient on a Statin? YES             Is the patient on Aspirin? NO    Medications     HMG CoA Reductase Inhibitors     pravastatin (PRAVACHOL) 10 MG tablet       Salicylates     aspirin 81 MG tablet             Last three blood pressure readings:  BP Readings from Last 3 Encounters:   01/24/19 168/82   11/19/18 137/71   06/08/18 143/76       Date of last diabetes office visit: 01/24/2019     Tobacco History:     History   Smoking Status     Never Smoker   Smokeless Tobacco     Never Used         Hypertension   Last three blood pressure readings:  BP Readings from Last 3 Encounters:   01/24/19 168/82   11/19/18 137/71   06/08/18 143/76     Blood pressure: FAILED    HTN Guidelines:  Age 18-59 BP range:  Less than 140/90  Age 60-85 with Diabetes:  Less than 140/90  Age 60-85 without Diabetes:  less than 150/90      Composite cancer screening  Chart review shows that this patient is due/due soon for the following None  Summary:    Patient is due/failing the following:   BP CHECK and PHYSICAL    Action needed:   Patient needs office visit for Medicare wellness exam and recheck blood pressure.    Type of outreach:    Sent letter.    Questions for provider review:    None                                                                                                                                     Emily Mcfadden CMA.       Chart routed to none.

## 2019-03-22 NOTE — LETTER
[address]  [facility] [address]  Phone: [phone]  Fax: [phone]              March 22, 2019      Alec ANTONY  [place] [address]        Dear Alec,    I care about your health and have reviewed your health plan. I have reviewed your medical conditions, medication list, and lab results and am making recommendations based on this review, to better manage your health.    You are in particular need of attention regarding:  -High Blood Pressure  -Wellness (Physical) Visit     I am recommending that you:  -schedule a WELLNESS (Physical) APPOINTMENT with me.   I will check fasting labs the same day - nothing to eat except water and meds for 8-10 hours prior.      Here is a list of Health Maintenance topics that are due now or due soon:  Health Maintenance Due   Topic Date Due     Zoster (Shingles) Vaccine (1 of 2) 09/10/1998     Discuss Advance Directive Planning  09/10/2003     AORTIC ANEURYSM SCREENING (SYSTEM ASSIGNED)  09/10/2013     Eye Exam - yearly  09/01/2018     Annual Wellness Visit  10/03/2018     FALL RISK ASSESSMENT  10/03/2018     Microalbumin Lab - yearly  04/17/2019       Please call us at [phone] (or use [employer]) to address the above recommendations.     Thank you for trusting [facility] and we appreciate the opportunity to serve you.  We look forward to supporting your healthcare needs in the future.    Healthy Regards,    Riaz Torres MD

## 2019-05-06 NOTE — PATIENT INSTRUCTIONS
Preventive Health Recommendations:     See your health care provider every year to    Review health changes.     Discuss preventive care.      Review your medicines if your doctor has prescribed any.      Talk with your health care provider about whether you should have a test to screen for prostate cancer (PSA).    Every 3 years, have a diabetes test (fasting glucose). If you are at risk for diabetes, you should have this test more often.    Every 5 years, have a cholesterol test. Have this test more often if you are at risk for high cholesterol or heart disease.     Every 10 years, have a colonoscopy. Or, have a yearly FIT test (stool test). These exams will check for colon cancer.    Talk to with your health care provider about screening for Abdominal Aortic Aneurysm if you have a family history of AAA or have a history of smoking.    Shots:     Get a flu shot each year.     Get a tetanus shot every 10 years.     Talk to your doctor about your pneumonia vaccines. There are now two you should receive - Pneumovax (PPSV 23) and Prevnar (PCV 13).     Talk to your pharmacist about a shingles vaccine.     Talk to your doctor about the hepatitis B vaccine.  Nutrition:     Eat at least 5 servings of fruits and vegetables each day.     Eat whole-grain bread, whole-wheat pasta and brown rice instead of white grains and rice.     Get adequate Calcium and Vitamin D.   Lifestyle    Exercise for at least 150 minutes a week (30 minutes a day, 5 days a week). This will help you control your weight and prevent disease.     Limit alcohol to one drink per day.     No smoking.     Wear sunscreen to prevent skin cancer.    See your dentist every six months for an exam and cleaning.    See your eye doctor every 1 to 2 years to screen for conditions such as glaucoma, macular degeneration, cataracts, etc.    Personalized Prevention Plan  You are due for the preventive services outlined below.  Your care team is available to assist you  in scheduling these services.  If you have already completed any of these items, please share that information with your care team to update in your medical record.  Health Maintenance Due   Topic Date Due     Zoster (Shingles) Vaccine (1 of 2) 09/10/1998     Discuss Advance Directive Planning  09/10/2003     AORTIC ANEURYSM SCREENING (SYSTEM ASSIGNED)  09/10/2013     Eye Exam - yearly  09/01/2018     Annual Wellness Visit  10/03/2018     FALL RISK ASSESSMENT  10/03/2018     PHQ-2  01/01/2019     Microalbumin Lab - yearly  04/17/2019     A1C (Diabetes) Lab - every 6 months  05/19/2019

## 2019-05-06 NOTE — PROGRESS NOTES
"  SUBJECTIVE:   Alec Hanson is a 68 year old male who presents for Preventive Visit.  Are you in the first 12 months of your Medicare Part B coverage?  No    Physical Health:    In general, how would you rate your overall physical health? good    Outside of work, how many days during the week do you exercise? 2-3 days/week    Outside of work, approximately how many minutes a day do you exercise?30-45 minutes    If you drink alcohol do you typically have >3 drinks per day or >7 drinks per week? No    Do you usually eat at least 4 servings of fruit and vegetables a day, include whole grains & fiber and avoid regularly eating high fat or \"junk\" foods? Yes    Do you have any problems taking medications regularly?  No    Do you have any side effects from medications? not applicable    Needs assistance for the following daily activities: no assistance needed    Which of the following safety concerns are present in your home?  none identified     Hearing impairment: Yes, a little on the right side, hard to hear a little    In the past 6 months, have you been bothered by leaking of urine? no    Mental Health:    In general, how would you rate your overall mental or emotional health? good  PHQ-2 Score:      Do you feel safe in your environment? Yes    Do you have a Health Care Directive? No: Advance care planning was reviewed with patient; patient declined at this time.    Additional concerns to address?  No    Fall risk:  Fallen 2 or more times in the past year?: No  Any fall with injury in the past year?: No  click delete button to remove this line now  Cognitive Screenin) Repeat 3 items (Leader, Season, Table)    2) Clock draw: NORMAL  3) 3 item recall: Recalls 1 object   Results: ABNORMAL clock, 1-2 items recalled: PROBABLE COGNITIVE IMPAIRMENT, **INFORM PROVIDER**    Mini-CogTM Copyright [employer]. Licensed by the author for use in [facility]; reprinted with permission (thong@.[facility]). All rights " reserved.      Do you have sleep apnea, excessive snoring or daytime drowsiness?: yes        Diabetes Follow-up      Patient is checking blood sugars: not at all    Diabetic concerns: None     Symptoms of hypoglycemia (low blood sugar): none     Paresthesias (numbness or burning in feet) or sores: No     Date of last diabetic eye exam: 2019    Diabetes Management Resources    Hyperlipidemia Follow-Up      Rate your low fat/cholesterol diet?: good    Taking statin?  No    Other lipid medications/supplements?:  none    Hypertension Follow-up      Outpatient blood pressures are not being checked.    Low Salt Diet: no added salt    BP Readings from Last 2 Encounters:   05/07/19 138/84   01/24/19 168/82     Hemoglobin A1C (%)   Date Value   11/19/2018 6.8 (H)   04/17/2018 6.6 (H)     LDL Cholesterol Calculated (mg/dL)   Date Value   11/19/2018 160 (H)   10/03/2017 169 (H)       Reviewed and updated as needed this visit by clinical staff  Tobacco  Allergies  Meds  Problems  Med Hx  Surg Hx  Fam Hx  Soc Hx          Reviewed and updated as needed this visit by Provider  Allergies  Meds  Problems        Social History     Tobacco Use     Smoking status: Never Smoker     Smokeless tobacco: Never Used   Substance Use Topics     Alcohol use: No                           Current providers sharing in care for this patient include:   Patient Care Team:  Riaz Torres MD as PCP - General (Family Practice)  Riaz Torres MD as Assigned PCP  Yael Hanks MD as MD (Ophthalmology)  Riaz Torres MD as MD (Family Practice)  Aravind Gloria MD as MD (Neurology)    The following health maintenance items are reviewed in Epic and correct as of today:  Health Maintenance   Topic Date Due     ZOSTER IMMUNIZATION (1 of 2) 09/10/1998     ADVANCE DIRECTIVE PLANNING Q5 YRS  09/10/2003     AORTIC ANEURYSM SCREENING (SYSTEM ASSIGNED)  09/10/2013     EYE EXAM Q1 YEAR  09/01/2018      "MEDICARE ANNUAL WELLNESS VISIT  10/03/2018     FALL RISK ASSESSMENT  10/03/2018     PHQ-2  01/01/2019     MICROALBUMIN Q1 YEAR  04/17/2019     A1C Q6 MO  05/19/2019     FOOT EXAM Q1 YEAR  11/19/2019     CREATININE Q1 YEAR  11/19/2019     LIPID MONITORING Q1 YEAR  11/19/2019     TSH W/ FREE T4 REFLEX Q2 YEAR  06/08/2020     DTAP/TDAP/TD IMMUNIZATION (2 - Td) 07/31/2024     COLON CANCER SCREEN (SYSTEM ASSIGNED)  01/01/2026     INFLUENZA VACCINE  Completed     PNEUMOCOCCAL IMMUNIZATION 65+ LOW/MEDIUM RISK  Completed     HEPATITIS C SCREENING  Completed     IPV IMMUNIZATION  Aged Out     MENINGITIS IMMUNIZATION  Aged Out     Labs reviewed in EPIC  Pneumonia Vaccine:Adults age 65+ who received Pneumovax (PPSV23) at 65 years or older: Should be given PCV13 > 1 year after their most recent PPSV23    ROS:  Constitutional, HEENT, cardiovascular, pulmonary, gi and gu systems are negative, except as otherwise noted.    OBJECTIVE:   /84   Pulse 70   Temp 98  F (36.7  C) (Oral)   Ht 1.73 m (5' 8.11\")   Wt 71.1 kg (156 lb 12.8 oz)   SpO2 96%   BMI 23.76 kg/m   Estimated body mass index is 23.76 kg/m  as calculated from the following:    Height as of this encounter: 1.73 m (5' 8.11\").    Weight as of this encounter: 71.1 kg (156 lb 12.8 oz).  EXAM:   GENERAL: healthy, alert and no distress  EYES: Eyes grossly normal to inspection, PERRL and conjunctivae and sclerae normal  HENT: ear canals and TM's normal, nose and mouth without ulcers or lesions  NECK: no adenopathy, no asymmetry, masses, or scars and thyroid normal to palpation  RESP: lungs clear to auscultation - no rales, rhonchi or wheezes  CV: regular rate and rhythm, normal S1 S2, no S3 or S4, no murmur, click or rub, no peripheral edema and peripheral pulses strong  ABDOMEN: soft, nontender, no hepatosplenomegaly, no masses and bowel sounds normal   (male): normal male genitalia without lesions or urethral discharge, no hernia  MS: no gross musculoskeletal " defects noted, no edema  SKIN: no suspicious lesions or rashes  NEURO: Normal strength and tone, mentation intact and speech normal  PSYCH: mentation appears normal, affect normal/bright  LYMPH: no cervical, supraclavicular, axillary, or inguinal adenopathy    Diagnostic Test Results:  none     ASSESSMENT / PLAN:   1. Routine general medical examination at a health care facility  In good health  - Lipid Profile    2. Controlled type 2 diabetes mellitus without complication, without long-term current use of insulin (H)  Well controlled   - Albumin Random Urine Quantitative with Creat Ratio  - Hemoglobin A1c  - Comprehensive metabolic panel  - aspirin (ASA) 81 MG tablet; Take 1 tablet (81 mg) by mouth daily  Dispense: 90 tablet; Refill: 3    3. Essential hypertension with goal blood pressure less than 140/90  Well controlled   - Albumin Random Urine Quantitative with Creat Ratio  - Comprehensive metabolic panel  - Lipid Profile    4. Vitamin D deficiency  Recheck off medication   - Vitamin D Deficiency    5. Screening for prostate cancer  sent  - PSA, screen    6. Hypertension goal BP (blood pressure) < 140/90    - metoprolol succinate ER (TOPROL-XL) 25 MG 24 hr tablet; Take 1 tablet (25 mg) by mouth daily  Dispense: 90 tablet; Refill: 3    End of Life Planning:  Patient currently has an advanced directive: No.  I have verified the patient's ablity to prepare an advanced directive/make health care decisions.  Literature was provided to assist patient in preparing an advanced directive.    COUNSELING:  Reviewed preventive health counseling, as reflected in patient instructions       Regular exercise       Healthy diet/nutrition       Vision screening       Hearing screening       Dental care       Colon cancer screening       Prostate cancer screening    BP Readings from Last 1 Encounters:   05/07/19 138/84     Estimated body mass index is 23.76 kg/m  as calculated from the following:    Height as of this encounter:  "1.73 m (5' 8.11\").    Weight as of this encounter: 71.1 kg (156 lb 12.8 oz).           reports that he has never smoked. He has never used smokeless tobacco.      Appropriate preventive services were discussed with this patient, including applicable screening as appropriate for cardiovascular disease, diabetes, osteopenia/osteoporosis, and glaucoma.  As appropriate for age/gender, discussed screening for colorectal cancer, prostate cancer, breast cancer, and cervical cancer. Checklist reviewing preventive services available has been given to the patient.    Reviewed patients plan of care and provided an AVS. The Basic Care Plan (routine screening as documented in Health Maintenance) for Alec meets the Care Plan requirement. This Care Plan has been established and reviewed with the Patient.    Counseling Resources:  ATP IV Guidelines  Pooled Cohorts Equation Calculator  Breast Cancer Risk Calculator  FRAX Risk Assessment  ICSI Preventive Guidelines  Dietary Guidelines for Americans, 2010  USDA's MyPlate  ASA Prophylaxis  Lung CA Screening    Riaz Torres MD  [facility] [place]  "

## 2019-05-07 ENCOUNTER — OFFICE VISIT (OUTPATIENT)
Dept: FAMILY MEDICINE | Facility: CLINIC | Age: 69
End: 2019-05-07
Payer: MEDICARE

## 2019-05-07 VITALS
HEIGHT: 68 IN | BODY MASS INDEX: 23.76 KG/M2 | WEIGHT: 156.8 LBS | SYSTOLIC BLOOD PRESSURE: 138 MMHG | OXYGEN SATURATION: 96 % | HEART RATE: 70 BPM | TEMPERATURE: 98 F | DIASTOLIC BLOOD PRESSURE: 84 MMHG

## 2019-05-07 DIAGNOSIS — Z00.00 ROUTINE GENERAL MEDICAL EXAMINATION AT A HEALTH CARE FACILITY: Primary | ICD-10-CM

## 2019-05-07 DIAGNOSIS — E11.9 CONTROLLED TYPE 2 DIABETES MELLITUS WITHOUT COMPLICATION, WITHOUT LONG-TERM CURRENT USE OF INSULIN (H): ICD-10-CM

## 2019-05-07 DIAGNOSIS — Z12.5 SCREENING FOR PROSTATE CANCER: ICD-10-CM

## 2019-05-07 DIAGNOSIS — I10 HYPERTENSION GOAL BP (BLOOD PRESSURE) < 140/90: ICD-10-CM

## 2019-05-07 DIAGNOSIS — I10 ESSENTIAL HYPERTENSION WITH GOAL BLOOD PRESSURE LESS THAN 140/90: ICD-10-CM

## 2019-05-07 DIAGNOSIS — E55.9 VITAMIN D DEFICIENCY: ICD-10-CM

## 2019-05-07 LAB
ALBUMIN SERPL-MCNC: 4.1 G/DL (ref 3.4–5)
ALP SERPL-CCNC: 54 U/L (ref 40–150)
ALT SERPL W P-5'-P-CCNC: 23 U/L (ref 0–70)
ANION GAP SERPL CALCULATED.3IONS-SCNC: 4 MMOL/L (ref 3–14)
AST SERPL W P-5'-P-CCNC: 20 U/L (ref 0–45)
BILIRUB SERPL-MCNC: 1 MG/DL (ref 0.2–1.3)
BUN SERPL-MCNC: 11 MG/DL (ref 7–30)
CALCIUM SERPL-MCNC: 9.4 MG/DL (ref 8.5–10.1)
CHLORIDE SERPL-SCNC: 103 MMOL/L (ref 94–109)
CHOLEST SERPL-MCNC: 227 MG/DL
CO2 SERPL-SCNC: 28 MMOL/L (ref 20–32)
CREAT SERPL-MCNC: 1.04 MG/DL (ref 0.66–1.25)
DEPRECATED CALCIDIOL+CALCIFEROL SERPL-MC: 41 UG/L (ref 20–75)
GFR SERPL CREATININE-BSD FRML MDRD: 72 ML/MIN/{1.73_M2}
GLUCOSE SERPL-MCNC: 147 MG/DL (ref 70–99)
HBA1C MFR BLD: 6.4 % (ref 0–5.6)
HDLC SERPL-MCNC: 46 MG/DL
LDLC SERPL CALC-MCNC: 147 MG/DL
NONHDLC SERPL-MCNC: 181 MG/DL
POTASSIUM SERPL-SCNC: 4.5 MMOL/L (ref 3.4–5.3)
PROT SERPL-MCNC: 8.1 G/DL (ref 6.8–8.8)
PSA SERPL-ACNC: 0.73 UG/L (ref 0–4)
SODIUM SERPL-SCNC: 135 MMOL/L (ref 133–144)
TRIGL SERPL-MCNC: 169 MG/DL

## 2019-05-07 PROCEDURE — 82043 UR ALBUMIN QUANTITATIVE: CPT | Performed by: FAMILY MEDICINE

## 2019-05-07 PROCEDURE — 36415 COLL VENOUS BLD VENIPUNCTURE: CPT | Performed by: FAMILY MEDICINE

## 2019-05-07 PROCEDURE — G0439 PPPS, SUBSEQ VISIT: HCPCS | Performed by: FAMILY MEDICINE

## 2019-05-07 PROCEDURE — G0103 PSA SCREENING: HCPCS | Performed by: FAMILY MEDICINE

## 2019-05-07 PROCEDURE — 82306 VITAMIN D 25 HYDROXY: CPT | Performed by: FAMILY MEDICINE

## 2019-05-07 PROCEDURE — 80061 LIPID PANEL: CPT | Performed by: FAMILY MEDICINE

## 2019-05-07 PROCEDURE — 80053 COMPREHEN METABOLIC PANEL: CPT | Performed by: FAMILY MEDICINE

## 2019-05-07 PROCEDURE — 83036 HEMOGLOBIN GLYCOSYLATED A1C: CPT | Performed by: FAMILY MEDICINE

## 2019-05-07 RX ORDER — METOPROLOL SUCCINATE 25 MG/1
25 TABLET, EXTENDED RELEASE ORAL DAILY
Qty: 90 TABLET | Refills: 3 | Status: SHIPPED | OUTPATIENT
Start: 2019-05-07 | End: 2020-07-31

## 2019-05-07 ASSESSMENT — MIFFLIN-ST. JEOR: SCORE: 1447.49

## 2019-05-08 LAB
CREAT UR-MCNC: 134 MG/DL
MICROALBUMIN UR-MCNC: 11 MG/L
MICROALBUMIN/CREAT UR: 8.43 MG/G CR (ref 0–17)

## 2019-05-31 ENCOUNTER — TELEPHONE (OUTPATIENT)
Dept: FAMILY MEDICINE | Facility: CLINIC | Age: 69
End: 2019-05-31

## 2019-07-03 NOTE — PROGRESS NOTES
Subjective     Alec Hanson is a 68 year old male who presents to clinic today for the following health issues:    HPI   Constipation      Duration: weeks    Description:       Frequency of bowel movements: 2-4 days       Consistency of stool: firm    Intensity:  moderate    Accompanying signs and symptoms: noine       Abdominal pain: no        Rectal pain: no        Blood in stool: no        Nausea/vomitting: no     History:        Similar problems in past: YES            Medications worsening symptoms: YES    Therapies tried and outcome: OTc prn       Chronic laxative use: no        Encounter Diagnoses   Name Primary?     Essential hypertension with goal blood pressure less than 140/90   has lost weight, feels worse on B Blocker  Wants to try goiung off of it Yes     Insomnia, unspecified type, hard to fall asleep most nights then feels very tired next day' wants to try safe medication       Slow transit constipation            Current Outpatient Medications   Medication Sig Dispense Refill     aspirin (ASA) 81 MG tablet Take 1 tablet (81 mg) by mouth daily 90 tablet 3     blood glucose monitoring ([employer]) lancets Use to test blood sugar 2 times daily or as directed. 1 Box 3     Cholecalciferol (VITAMIN D PO)        metoprolol succinate ER (TOPROL-XL) 25 MG 24 hr tablet Take 1 tablet (25 mg) by mouth daily 90 tablet 3     polyethylene glycol (MIRALAX/GLYCOLAX) packet Take 17 g by mouth daily 100 packet 3     traZODone (DESYREL) 50 MG tablet Take 1 tablet (50 mg) by mouth At Bedtime 30 tablet 3         Reviewed and updated as needed this visit by Provider         Review of Systems   ROS COMP: Constitutional, HEENT, cardiovascular, pulmonary, gi and gu systems are negative, except as otherwise noted.      Objective    /80 (BP Location: Right arm, Patient Position: Sitting, Cuff Size: Adult Regular)   Pulse 60   Temp 97.6  F (36.4  C) (Oral)   Resp 14   Wt 69.4 kg (153 lb)   SpO2 100%   BMI 23.19  kg/m    Body mass index is 23.19 kg/m .  Physical Exam   GENERAL: healthy, alert and no distress  NECK: no adenopathy, no asymmetry, masses, or scars and thyroid normal to palpation  RESP: lungs clear to auscultation - no rales, rhonchi or wheezes  CV: regular rate and rhythm, normal S1 S2, no S3 or S4, no murmur, click or rub, no peripheral edema and peripheral pulses strong  ABDOMEN: soft, nontender, no hepatosplenomegaly, no masses and bowel sounds normal  MS: no gross musculoskeletal defects noted, no edema  NEURO: Normal strength and tone, mentation intact and speech normal    Diagnostic Test Results:  Labs reviewed in Epic        Assessment & Plan     1. Essential hypertension with goal blood pressure less than 140/90  Well controlled off medication  Keep wa;lking   Follow up by phone in 4 weeks if  BP highe r off medication     2. Insomnia, unspecified type  try  - traZODone (DESYREL) 50 MG tablet; Take 1 tablet (50 mg) by mouth At Bedtime  Dispense: 30 tablet; Refill: 3    3. Slow transit constipation  Add  Fluids, fiber  - polyethylene glycol (MIRALAX/GLYCOLAX) packet; Take 17 g by mouth daily  Dispense: 100 packet; Refill: 3       Regular exercise  See Patient Instructions    No follow-ups on file.    Riaz Torres MD  [facility] [place]

## 2019-07-05 ENCOUNTER — OFFICE VISIT (OUTPATIENT)
Dept: FAMILY MEDICINE | Facility: CLINIC | Age: 69
End: 2019-07-05
Payer: MEDICARE

## 2019-07-05 VITALS
HEART RATE: 60 BPM | BODY MASS INDEX: 23.19 KG/M2 | DIASTOLIC BLOOD PRESSURE: 80 MMHG | RESPIRATION RATE: 14 BRPM | WEIGHT: 153 LBS | TEMPERATURE: 97.6 F | SYSTOLIC BLOOD PRESSURE: 124 MMHG | OXYGEN SATURATION: 100 %

## 2019-07-05 DIAGNOSIS — I10 ESSENTIAL HYPERTENSION WITH GOAL BLOOD PRESSURE LESS THAN 140/90: Primary | ICD-10-CM

## 2019-07-05 DIAGNOSIS — G47.00 INSOMNIA, UNSPECIFIED TYPE: ICD-10-CM

## 2019-07-05 DIAGNOSIS — K59.01 SLOW TRANSIT CONSTIPATION: ICD-10-CM

## 2019-07-05 PROCEDURE — 99214 OFFICE O/P EST MOD 30 MIN: CPT | Performed by: FAMILY MEDICINE

## 2019-07-05 RX ORDER — TRAZODONE HYDROCHLORIDE 50 MG/1
50 TABLET, FILM COATED ORAL AT BEDTIME
Qty: 30 TABLET | Refills: 3 | Status: SHIPPED | OUTPATIENT
Start: 2019-07-05 | End: 2020-07-31

## 2019-07-05 RX ORDER — POLYETHYLENE GLYCOL 3350 17 G/17G
1 POWDER, FOR SOLUTION ORAL DAILY
Qty: 100 PACKET | Refills: 3 | Status: SHIPPED | OUTPATIENT
Start: 2019-07-05

## 2019-07-05 ASSESSMENT — PAIN SCALES - GENERAL: PAINLEVEL: NO PAIN (0)

## 2019-07-18 ENCOUNTER — TELEPHONE (OUTPATIENT)
Dept: FAMILY MEDICINE | Facility: CLINIC | Age: 69
End: 2019-07-18

## 2019-07-18 NOTE — TELEPHONE ENCOUNTER
Pt is requesting to speak with a nurse regardving traZODone (DESYREL) 50 MG tablet    Pt can be reached @ [phone] paresh

## 2019-07-18 NOTE — TELEPHONE ENCOUNTER
Dr. Torres,  Spoke with patient, patient states he started taking traZODone (DESYREL) 50 MG tablet. He has been on medication for approximately one week    Since he started taking medication, patient states he is not sleeping better. Patient states he is experiencing dizziness and lightheadedness with standing. He states he is weak and unsteady. Patient is unable to drive. Patient declines chest pain; difficulty breathing; chest pain; numbness tingling or visual changes.     Patient states he stopped talking the medication last evening and he reports today his symptoms are better.     Should medication be discontinued? Are these known side effects of trazodone?     Please advise    Thank You!  Michelle Eckert, CATHLEEN  Triage Nurse

## 2019-07-18 NOTE — TELEPHONE ENCOUNTER
Copied from Dr. Torres's routing comment: Yes stop medication     Called patient, relayed provider message above. Patient verbalized understanding    Michelle Eckert, RN  Triage Nurse

## 2019-10-17 ENCOUNTER — OFFICE VISIT (OUTPATIENT)
Dept: FAMILY MEDICINE | Facility: CLINIC | Age: 69
End: 2019-10-17
Payer: MEDICARE

## 2019-10-17 ENCOUNTER — TELEPHONE (OUTPATIENT)
Dept: FAMILY MEDICINE | Facility: CLINIC | Age: 69
End: 2019-10-17

## 2019-10-17 ENCOUNTER — HOSPITAL ENCOUNTER (OUTPATIENT)
Dept: GENERAL RADIOLOGY | Facility: CLINIC | Age: 69
Discharge: HOME | End: 2019-10-17
Attending: FAMILY MEDICINE | Admitting: FAMILY MEDICINE
Payer: MEDICARE

## 2019-10-17 ENCOUNTER — HOSPITAL ENCOUNTER (OUTPATIENT)
Dept: GENERAL RADIOLOGY | Facility: CLINIC | Age: 69
End: 2019-10-17
Attending: FAMILY MEDICINE
Payer: MEDICARE

## 2019-10-17 VITALS
SYSTOLIC BLOOD PRESSURE: 136 MMHG | DIASTOLIC BLOOD PRESSURE: 82 MMHG | WEIGHT: 155.8 LBS | BODY MASS INDEX: 23.61 KG/M2 | RESPIRATION RATE: 16 BRPM | OXYGEN SATURATION: 98 % | TEMPERATURE: 96.1 F | HEIGHT: 68 IN | HEART RATE: 82 BPM

## 2019-10-17 DIAGNOSIS — S60.221A CONTUSION OF RIGHT HAND, INITIAL ENCOUNTER: ICD-10-CM

## 2019-10-17 DIAGNOSIS — Z23 NEED FOR PROPHYLACTIC VACCINATION AND INOCULATION AGAINST INFLUENZA: ICD-10-CM

## 2019-10-17 DIAGNOSIS — M54.6 ACUTE BILATERAL THORACIC BACK PAIN: Primary | ICD-10-CM

## 2019-10-17 DIAGNOSIS — M54.6 ACUTE BILATERAL THORACIC BACK PAIN: ICD-10-CM

## 2019-10-17 DIAGNOSIS — R22.2 LUMP OF SKIN OF BACK: ICD-10-CM

## 2019-10-17 PROCEDURE — 88342 IMHCHEM/IMCYTCHM 1ST ANTB: CPT | Performed by: FAMILY MEDICINE

## 2019-10-17 PROCEDURE — 90662 IIV NO PRSV INCREASED AG IM: CPT | Performed by: FAMILY MEDICINE

## 2019-10-17 PROCEDURE — 73130 X-RAY EXAM OF HAND: CPT | Mod: RT

## 2019-10-17 PROCEDURE — 88305 TISSUE EXAM BY PATHOLOGIST: CPT | Performed by: FAMILY MEDICINE

## 2019-10-17 PROCEDURE — G0008 ADMIN INFLUENZA VIRUS VAC: HCPCS | Performed by: FAMILY MEDICINE

## 2019-10-17 PROCEDURE — 88341 IMHCHEM/IMCYTCHM EA ADD ANTB: CPT | Mod: 26 | Performed by: FAMILY MEDICINE

## 2019-10-17 PROCEDURE — 88305 TISSUE EXAM BY PATHOLOGIST: CPT | Mod: 26 | Performed by: FAMILY MEDICINE

## 2019-10-17 PROCEDURE — 72070 X-RAY EXAM THORAC SPINE 2VWS: CPT

## 2019-10-17 PROCEDURE — 99213 OFFICE O/P EST LOW 20 MIN: CPT | Mod: 25 | Performed by: FAMILY MEDICINE

## 2019-10-17 PROCEDURE — 11303 SHAVE SKIN LESION >2.0 CM: CPT | Performed by: FAMILY MEDICINE

## 2019-10-17 PROCEDURE — 88342 IMHCHEM/IMCYTCHM 1ST ANTB: CPT | Mod: 26 | Performed by: FAMILY MEDICINE

## 2019-10-17 PROCEDURE — 88341 IMHCHEM/IMCYTCHM EA ADD ANTB: CPT | Performed by: FAMILY MEDICINE

## 2019-10-17 ASSESSMENT — MIFFLIN-ST. JEOR: SCORE: 1436.2

## 2019-10-17 NOTE — PROGRESS NOTES
Subjective     Alec Hanson is a 69 year old male who presents to clinic today for the following health issues:    HPI   Back Pain       Duration: 3-4 months        Specific cause: unsure    Description:   Location of pain: middle of back left and upper back left  Character of pain: tightness  Pain radiation:none  New numbness or weakness in legs, not attributed to pain:  no     Intensity: Currently 3-4/10    History:   Pain interferes with job: No  History of back problems: no prior back problems  Any previous MRI or X-rays: None  Sees a specialist for back pain:  No  Therapies tried without relief: Ibuprofen and stretch    Alleviating factors:   Improved by: stretch      Precipitating factors:  Worsened by: Sitting          Accompanying Signs & Symptoms:  Risk of Fracture:  None  Risk of Cauda Equina:  None  Risk of Infection:  None  Risk of Cancer:  None  Risk of Ankylosing Spondylitis:  Onset at age <35, male, AND morning back stiffness. no     2) SUBJECTIVE:  Alec Hanson is a 69 year old male who sustained a right hand injury A FEW MONTHS  ago. Mechanism of injury: SLAMMED DOOR ON HAND. Immediate symptoms: immediate pain, was able to use hand directly after injury. Symptoms have been improving since that time. Prior history of related problems: no prior problems with this area in the past.  Notes pain at distal 2nd and 3rd metacarpals      3) has lump/ mass upper back , would like to have it excised           Patient Active Problem List   Diagnosis     CARDIOVASCULAR SCREENING; LDL GOAL LESS THAN 160     TB lung, latent     AMD (age-related macular degeneration), bilateral     DM (diabetes mellitus), type 2 with ophthalmic complications (H)     Type 2 diabetes mellitus with other diabetic ophthalmic complication (H)     Essential hypertension with goal blood pressure less than 140/90     Controlled type 2 diabetes mellitus without complication, without long-term current use of insulin (H)     Past Surgical  "History:   Procedure Laterality Date     CATARACT IOL, RT/LT Bilateral 2008       Social History     Tobacco Use     Smoking status: Never Smoker     Smokeless tobacco: Never Used   Substance Use Topics     Alcohol use: No     Family History   Problem Relation Age of Onset     Glaucoma No family hx of      Macular Degeneration No family hx of            Reviewed and updated as needed this visit by Provider         Review of Systems   ROS COMP: Constitutional, HEENT, cardiovascular, pulmonary, GI, , musculoskeletal, neuro, skin, endocrine and psych systems are negative, except as otherwise noted.      Objective    /82   Pulse 82   Temp 96.1  F (35.6  C) (Temporal)   Resp 16   Ht 1.727 m (5' 8\")   Wt 70.7 kg (155 lb 12.8 oz)   SpO2 98%   BMI 23.69 kg/m    Body mass index is 23.69 kg/m .  Physical Exam OBJECTIVE:  Vital signs as noted above.  Appearance: in no apparent distress.  Hand exam: soft tissue tenderness and swelling at the distal 2nd and 3rd metacarpals and phalanges, scaphoid (snuffbox) tenderness absent, radial pulse normal, remainder of ipsilateral wrist, hand and finger exam is normal, normal contralateral hand and wrist.  X-ray: ordered, but results not yet available.      NECK: no adenopathy, no asymmetry, masses, or scars and thyroid normal to palpation  RESP: lungs clear to auscultation - no rales, rhonchi or wheezes  CV: regular rate and rhythm, normal S1 S2, no S3 or S4, no murmur, click or rub, no peripheral edema and peripheral pulses strong  ABDOMEN: soft, nontender, no hepatosplenomegaly, no masses and bowel sounds normal  MS: normal muscle tone and tenderness to palpation both rhomboids   SKIN: 2-3 cm superfical, califlower like nodule - upper back  NEURO: Normal strength and tone, mentation intact and speech normal  BACK: no CVA tenderness, no paralumbar tenderness    Diagnostic Test Results:  Labs reviewed in Epic        Assessment & Plan     1. Acute bilateral thoracic back " pain  Try ice  - XR Thoracic Spine 2 Views; Future  - [facility] PT, HAND, AND CHIROPRACTIC REFERRAL; Future    2. Lump of skin of back    Shave bx in typical fashion .  Area cleaned with betadyne and anesthetized with 1% lidocaine with epi . Then a #15 blade used to remove an area of his lesion and sent to pathology.  Bleeding was cauterized. Pt tolerated procedure well.  - Surgical pathology exam sent    3. Contusion of right hand, initial encounter  Ice, rest  - XR Hand Right G/E 3 Views; Future    PLAN:  NSAID, ice suggested  activity modification  see primary care physician in follow up  See orders in EpicCare.  4. Need for prophylactic vaccination and inoculation against influenza  done  - INFLUENZA (HIGH DOSE) 3 VALENT VACCINE [16642]  - Vaccine Administration, Initial [61871]       See Patient Instructions    No follow-ups on file.    Riaz Torres MD  [facility] [place]

## 2019-10-17 NOTE — TELEPHONE ENCOUNTER
Reason for call:  Other   Patient called regarding (reason for call): call back  Additional comments:   Patient stated she received a call at 12:00PM asking her to call [facility] and ask to speak with an RN. Notified she would receive a call back as soon as available.     Phone number to reach patient:  Cell number on file:    Telephone Information:   Mobile [phone]       Best Time:  any    Can we leave a detailed message on this number?  YES

## 2019-10-22 LAB — COPATH REPORT: NORMAL

## 2019-11-07 ENCOUNTER — HEALTH MAINTENANCE LETTER (OUTPATIENT)
Age: 69
End: 2019-11-07

## 2019-12-06 ENCOUNTER — OFFICE VISIT (OUTPATIENT)
Dept: FAMILY MEDICINE | Facility: CLINIC | Age: 69
End: 2019-12-06
Payer: MEDICARE

## 2019-12-06 VITALS
OXYGEN SATURATION: 98 % | DIASTOLIC BLOOD PRESSURE: 82 MMHG | HEART RATE: 73 BPM | WEIGHT: 155 LBS | SYSTOLIC BLOOD PRESSURE: 150 MMHG | TEMPERATURE: 97.6 F | BODY MASS INDEX: 23.57 KG/M2

## 2019-12-06 DIAGNOSIS — E11.9 CONTROLLED TYPE 2 DIABETES MELLITUS WITHOUT COMPLICATION, WITHOUT LONG-TERM CURRENT USE OF INSULIN (H): Primary | ICD-10-CM

## 2019-12-06 DIAGNOSIS — J18.9 PNEUMONIA OF BOTH LOWER LOBES DUE TO INFECTIOUS ORGANISM: ICD-10-CM

## 2019-12-06 LAB
ALBUMIN SERPL-MCNC: 3.6 G/DL (ref 3.4–5)
ALP SERPL-CCNC: 61 U/L (ref 40–150)
ALT SERPL W P-5'-P-CCNC: 24 U/L (ref 0–70)
ANION GAP SERPL CALCULATED.3IONS-SCNC: 7 MMOL/L (ref 3–14)
AST SERPL W P-5'-P-CCNC: 14 U/L (ref 0–45)
BILIRUB SERPL-MCNC: 0.4 MG/DL (ref 0.2–1.3)
BUN SERPL-MCNC: 12 MG/DL (ref 7–30)
CALCIUM SERPL-MCNC: 9.4 MG/DL (ref 8.5–10.1)
CHLORIDE SERPL-SCNC: 101 MMOL/L (ref 94–109)
CO2 SERPL-SCNC: 28 MMOL/L (ref 20–32)
CREAT SERPL-MCNC: 1.1 MG/DL (ref 0.66–1.25)
ERYTHROCYTE [DISTWIDTH] IN BLOOD BY AUTOMATED COUNT: 12.7 % (ref 10–15)
GFR SERPL CREATININE-BSD FRML MDRD: 67 ML/MIN/{1.73_M2}
GLUCOSE SERPL-MCNC: 213 MG/DL (ref 70–99)
HBA1C MFR BLD: 7 % (ref 0–5.6)
HCT VFR BLD AUTO: 37.7 % (ref 40–53)
HGB BLD-MCNC: 13.1 G/DL (ref 13.3–17.7)
MCH RBC QN AUTO: 31.7 PG (ref 26.5–33)
MCHC RBC AUTO-ENTMCNC: 34.7 G/DL (ref 31.5–36.5)
MCV RBC AUTO: 91 FL (ref 78–100)
PLATELET # BLD AUTO: 283 10E9/L (ref 150–450)
POTASSIUM SERPL-SCNC: 4.6 MMOL/L (ref 3.4–5.3)
PROT SERPL-MCNC: 7.4 G/DL (ref 6.8–8.8)
RBC # BLD AUTO: 4.13 10E12/L (ref 4.4–5.9)
SODIUM SERPL-SCNC: 136 MMOL/L (ref 133–144)
WBC # BLD AUTO: 6.6 10E9/L (ref 4–11)

## 2019-12-06 PROCEDURE — 99214 OFFICE O/P EST MOD 30 MIN: CPT | Performed by: FAMILY MEDICINE

## 2019-12-06 PROCEDURE — 99207 C FOOT EXAM  NO CHARGE: CPT | Mod: 25 | Performed by: FAMILY MEDICINE

## 2019-12-06 PROCEDURE — 85027 COMPLETE CBC AUTOMATED: CPT | Performed by: FAMILY MEDICINE

## 2019-12-06 PROCEDURE — 83036 HEMOGLOBIN GLYCOSYLATED A1C: CPT | Performed by: FAMILY MEDICINE

## 2019-12-06 PROCEDURE — 80053 COMPREHEN METABOLIC PANEL: CPT | Performed by: FAMILY MEDICINE

## 2019-12-06 PROCEDURE — 36415 COLL VENOUS BLD VENIPUNCTURE: CPT | Performed by: FAMILY MEDICINE

## 2019-12-06 RX ORDER — CODEINE PHOSPHATE AND GUAIFENESIN 10; 100 MG/5ML; MG/5ML
1-2 SOLUTION ORAL EVERY 6 HOURS PRN
Qty: 473 ML | Refills: 0 | Status: SHIPPED | OUTPATIENT
Start: 2019-12-06

## 2019-12-06 RX ORDER — AZITHROMYCIN 250 MG/1
TABLET, FILM COATED ORAL
Qty: 6 TABLET | Refills: 0 | Status: SHIPPED | OUTPATIENT
Start: 2019-12-06 | End: 2019-12-11

## 2019-12-26 NOTE — PROGRESS NOTES
Subjective     Alec Hanson is a 69 year old male who presents to clinic today for the following health issues:    HPI   Acute Illness   Acute illness concerns: Cough, no other symptoms, keeps him up at night and wakes him  Onset: over 3 weeks    Fever: no    Chills/Sweats: no    Headache (location?): no    Sinus Pressure:no    Conjunctivitis:  no    Ear Pain: no    Rhinorrhea: no    Congestion: no    Sore Throat: feels a little irritated from coughing     Cough: YES-productive of clear sputum    Wheeze: no    Decreased Appetite: no    Nausea: no    Vomiting: no    Diarrhea:  no    Dysuria/Freq.: no    Fatigue/Achiness: no    Sick/Strep Exposure: no     Therapies Tried and outcome: cough drops    Hypertension Follow-up      Do you check your blood pressure regularly outside of the clinic? Yes     Are you following a low salt diet? No    Are your blood pressures ever more than 140 on the top number (systolic) OR more   than 90 on the bottom number (diastolic), for example 140/90? No    history of latent TB, was treated times 9 months   need CR for work  Current Outpatient Medications   Medication Sig Dispense Refill     benzonatate (TESSALON) 200 MG capsule Take 1 capsule (200 mg) by mouth 2 times daily as needed for cough 20 capsule 1     aspirin (ASA) 81 MG tablet Take 1 tablet (81 mg) by mouth daily (Patient not taking: Reported on 12/27/2019) 90 tablet 3     blood glucose monitoring ([employer]) lancets Use to test blood sugar 2 times daily or as directed. (Patient not taking: Reported on 12/27/2019) 1 Box 3     Cholecalciferol (VITAMIN D PO)        guaiFENesin-codeine (ROBITUSSIN AC) 100-10 MG/5ML solution Take 5-10 mLs by mouth every 6 hours as needed for cough (Patient not taking: Reported on 12/27/2019) 473 mL 0     metoprolol succinate ER (TOPROL-XL) 25 MG 24 hr tablet Take 1 tablet (25 mg) by mouth daily (Patient not taking: Reported on 10/17/2019) 90 tablet 3     polyethylene glycol (MIRALAX/GLYCOLAX)  packet Take 17 g by mouth daily (Patient not taking: Reported on 10/17/2019) 100 packet 3     traZODone (DESYREL) 50 MG tablet Take 1 tablet (50 mg) by mouth At Bedtime (Patient not taking: Reported on 10/17/2019) 30 tablet 3         Reviewed and updated as needed this visit by Provider         Review of Systems   ROS COMP: Constitutional, HEENT, cardiovascular, pulmonary, gi and gu systems are negative, except as otherwise noted.      Objective    BP (!) 144/70   Pulse 83   Temp 97.7  F (36.5  C) (Temporal)   Resp 14   Wt 70.2 kg (154 lb 12.8 oz)   SpO2 100%   BMI 23.54 kg/m    Body mass index is 23.54 kg/m .  Physical Exam   GENERAL: healthy, alert and no distress  NECK: no adenopathy, no asymmetry, masses, or scars and thyroid normal to palpation  RESP: lungs clear to auscultation - no rales, rhonchi or wheezes  CV: regular rate and rhythm, normal S1 S2, no S3 or S4, no murmur, click or rub, no peripheral edema and peripheral pulses strong  ABDOMEN: soft, nontender, no hepatosplenomegaly, no masses and bowel sounds normal    Diagnostic Test Results:  Labs reviewed in Epic        Assessment & Plan     1. Cough  Likely viral  - benzonatate (TESSALON) 200 MG capsule; Take 1 capsule (200 mg) by mouth 2 times daily as needed for cough  Dispense: 20 capsule; Refill: 1  - XR Chest 2 Views; Future    2. TB lung, latent  Get CXR for work    3. Essential hypertension with goal blood pressure less than 140/90  Still high check at home and call me         Regular exercise  See Patient Instructions    No follow-ups on file.    Riaz Torres MD  [facility] [place]

## 2019-12-27 ENCOUNTER — HOSPITAL ENCOUNTER (OUTPATIENT)
Dept: GENERAL RADIOLOGY | Facility: CLINIC | Age: 69
Discharge: HOME | End: 2019-12-27
Attending: FAMILY MEDICINE | Admitting: FAMILY MEDICINE
Payer: MEDICARE

## 2019-12-27 ENCOUNTER — OFFICE VISIT (OUTPATIENT)
Dept: FAMILY MEDICINE | Facility: CLINIC | Age: 69
End: 2019-12-27
Payer: MEDICARE

## 2019-12-27 VITALS
TEMPERATURE: 97.7 F | HEART RATE: 83 BPM | OXYGEN SATURATION: 100 % | BODY MASS INDEX: 23.54 KG/M2 | RESPIRATION RATE: 14 BRPM | DIASTOLIC BLOOD PRESSURE: 70 MMHG | WEIGHT: 154.8 LBS | SYSTOLIC BLOOD PRESSURE: 144 MMHG

## 2019-12-27 DIAGNOSIS — R05.9 COUGH: Primary | ICD-10-CM

## 2019-12-27 DIAGNOSIS — I10 ESSENTIAL HYPERTENSION WITH GOAL BLOOD PRESSURE LESS THAN 140/90: ICD-10-CM

## 2019-12-27 DIAGNOSIS — R05.9 COUGH: ICD-10-CM

## 2019-12-27 DIAGNOSIS — Z22.7 TB LUNG, LATENT: ICD-10-CM

## 2019-12-27 PROCEDURE — 71046 X-RAY EXAM CHEST 2 VIEWS: CPT

## 2019-12-27 PROCEDURE — 99214 OFFICE O/P EST MOD 30 MIN: CPT | Performed by: FAMILY MEDICINE

## 2019-12-27 RX ORDER — BENZONATATE 200 MG/1
200 CAPSULE ORAL 2 TIMES DAILY PRN
Qty: 20 CAPSULE | Refills: 1 | Status: SHIPPED | OUTPATIENT
Start: 2019-12-27 | End: 2020-07-31

## 2020-07-30 NOTE — PROGRESS NOTES
"  SUBJECTIVE:   Alec Hanson is a 69 year old male who presents for Preventive Visit.    Are you in the first 12 months of your Medicare Part B coverage?  No    Physical Health:    In general, how would you rate your overall physical health? good    Outside of work, how many days during the week do you exercise? 4-5 days/week    Outside of work, approximately how many minutes a day do you exercise?30-45 minutes    If you drink alcohol do you typically have >3 drinks per day or >7 drinks per week? No    Do you usually eat at least 4 servings of fruit and vegetables a day, include whole grains & fiber and avoid regularly eating high fat or \"junk\" foods? Yes    Do you have any problems taking medications regularly?  No    Do you have any side effects from medications? none    Needs assistance for the following daily activities: no assistance needed    Which of the following safety concerns are present in your home?  none identified     Hearing impairment: No    In the past 6 months, have you been bothered by leaking of urine? no    Mental Health:    In general, how would you rate your overall mental or emotional health? good  PHQ-2 Score:      Do you feel safe in your environment? Yes    Have you ever done Advance Care Planning? (For example, a Health Directive, POLST, or a discussion with a medical provider or your loved ones about your wishes): No, advance care planning information given to patient to review.  Patient plans to discuss their wishes with loved ones or provider.      Additional concerns to address?  YES- constipation (everyday), and not being able to sleep.     Fall risk:  Fallen 2 or more times in the past year?: No  Any fall with injury in the past year?: No    Cognitive Screenin) Repeat 3 items (Leader, Season, Table)    2) Clock draw: NORMAL  3) 3 item recall: Recalls 2 objects   Results: NORMAL clock, 1-2 items recalled: COGNITIVE IMPAIRMENT LESS LIKELY     Mini-CogTM Copyright [employer]. " Licensed by the author for use in [facility]; reprinted with permission ([email or web address]@[facility]). All rights reserved.      Do you have sleep apnea, excessive snoring or daytime drowsiness?: no        Diabetes Follow-up      How often are you checking your blood sugar? Not at all         BP Readings from Last 2 Encounters:   07/31/20 132/70   12/27/19 (!) 144/70     Hemoglobin A1C (%)   Date Value   12/06/2019 7.0 (H)   05/07/2019 6.4 (H)     LDL Cholesterol Calculated (mg/dL)   Date Value   05/07/2019 147 (H)   11/19/2018 160 (H)               Hyperlipidemia Follow-Up he declines statin      Are you regularly taking any medication or supplement to lower your cholesterol?   No    Are you having muscle aches or other side effects that you think could be caused by your cholesterol lowering medication?  No    Hypertension Follow-up  Lost weight so stopped meds      Do you check your blood pressure regularly outside of the clinic? Yes     Are you following a low salt diet? Yes    Are your blood pressures ever more than 140 on the top number (systolic) OR more   than 90 on the bottom number (diastolic), for example 140/90? No      Reviewed and updated as needed this visit by clinical staff         Reviewed and updated as needed this visit by Provider        Social History     Tobacco Use     Smoking status: Never Smoker     Smokeless tobacco: Never Used   Substance Use Topics     Alcohol use: No                           Current providers sharing in care for this patient include:   Patient Care Team:  Riaz Torres MD as PCP - General (Family Practice)  Riaz Torres MD as Assigned PCP  Yael Hanks MD as MD (Ophthalmology)  Riaz Torres MD as MD (Family Practice)  Aravind Gloria MD as MD (Neurology)    The following health maintenance items are reviewed in Epic and correct as of today:  Health Maintenance   Topic Date Due     ADVANCE CARE PLANNING  1950  "    HEPATITIS B IMMUNIZATION (1 of 3 - Risk 3-dose series) 09/10/1967     ZOSTER IMMUNIZATION (1 of 2) 09/10/1998     AORTIC ANEURYSM SCREENING (SYSTEM ASSIGNED)  09/10/2013     EYE EXAM  09/01/2019     PHQ-2  01/01/2020     MEDICARE ANNUAL WELLNESS VISIT  05/07/2020     LIPID  05/07/2020     MICROALBUMIN  05/07/2020     FALL RISK ASSESSMENT  05/07/2020     A1C  06/06/2020     INFLUENZA VACCINE (1) 09/01/2020     BMP  12/06/2020     DIABETIC FOOT EXAM  12/06/2020     DTAP/TDAP/TD IMMUNIZATION (2 - Td) 07/31/2024     COLORECTAL CANCER SCREENING  01/01/2026     HEPATITIS C SCREENING  Completed     PNEUMOCOCCAL IMMUNIZATION 65+ LOW/MEDIUM RISK  Completed     IPV IMMUNIZATION  Aged Out     MENINGITIS IMMUNIZATION  Aged Out     Lab work is in process  Pneumonia Vaccine:Adults age 65+ who received Pneumovax (PPSV23) at 65 years or older: Should be given PCV13 > 1 year after their most recent PPSV23    ROS:  Constitutional, HEENT, cardiovascular, pulmonary, gi and gu systems are negative, except as otherwise noted.    OBJECTIVE:   There were no vitals taken for this visit. Estimated body mass index is 23.54 kg/m  as calculated from the following:    Height as of 10/17/19: 1.727 m (5' 8\").    Weight as of 12/27/19: 70.2 kg (154 lb 12.8 oz).  EXAM:   GENERAL: healthy, alert and no distress  EYES: Eyes grossly normal to inspection, PERRL and conjunctivae and sclerae normal  HENT: ear canals and TM's normal, nose and mouth without ulcers or lesions  NECK: no adenopathy, no asymmetry, masses, or scars and thyroid normal to palpation  RESP: lungs clear to auscultation - no rales, rhonchi or wheezes  CV: regular rate and rhythm, normal S1 S2, no S3 or S4, no murmur, click or rub, no peripheral edema and peripheral pulses strong  ABDOMEN: soft, nontender, no hepatosplenomegaly, no masses and bowel sounds normal   (male): testicles normal without atrophy or masses, no hernias, penis normal without urethral discharge   RECTAL " "(male): deferred  MS: no gross musculoskeletal defects noted, no edema  NEURO: Normal strength and tone, mentation intact and speech normal  PSYCH: mentation appears normal, affect normal/bright  LYMPH: no cervical, supraclavicular, axillary, or inguinal adenopathy  Skin: 2 cm scaly patch /right groin  Diagnostic Test Results:  Labs reviewed in Epic    ASSESSMENT / PLAN:   1. Encounter for Medicare annual wellness exam  Overall very fit  - Lipid panel reflex to direct LDL Fasting    2. Hypertension goal BP (blood pressure) < 140/90  Well controlled off medications   - Lipid panel reflex to direct LDL Fasting  - Albumin Random Urine Quantitative with Creat Ratio; Future  - Comprehensive metabolic panel  - Albumin Random Urine Quantitative with Creat Ratio    3. Special screening for malignant neoplasm of prostate  Sent per patient request  - PSA, screen    4. Type 2 diabetes mellitus without complication, without long-term current use of insulin (H)  patient declines treatment  - Hemoglobin A1c    5. Tinea corporis  Try medications . If not better Follow up with consultant as planned.   - terbinafine (LAMISIL) 1 % external cream; Apply topically 2 times daily for 14 days  Dispense: 30 g; Refill: 1  - triamcinolone (KENALOG) 0.1 % external cream; Apply topically 2 times daily for 14 days  Dispense: 30 g; Refill: 0  - DERMATOLOGY REFERRAL    COUNSELING:  Reviewed preventive health counseling, as reflected in patient instructions       Regular exercise       Healthy diet/nutrition       Vision screening       Hearing screening       Dental care       Bladder control       Fall risk prevention       Colon cancer screening       Prostate cancer screening    Estimated body mass index is 23.54 kg/m  as calculated from the following:    Height as of 10/17/19: 1.727 m (5' 8\").    Weight as of 12/27/19: 70.2 kg (154 lb 12.8 oz).         reports that he has never smoked. He has never used smokeless tobacco.      Appropriate " preventive services were discussed with this patient, including applicable screening as appropriate for cardiovascular disease, diabetes, osteopenia/osteoporosis, and glaucoma.  As appropriate for age/gender, discussed screening for colorectal cancer, prostate cancer, breast cancer, and cervical cancer. Checklist reviewing preventive services available has been given to the patient.    Reviewed patients plan of care and provided an AVS. The Intermediate Care Plan ( asthma action plan, low back pain action plan, and migraine action plan) for Alec meets the Care Plan requirement. This Care Plan has been established and reviewed with the Patient.    Counseling Resources:  ATP IV Guidelines  Pooled Cohorts Equation Calculator  Breast Cancer Risk Calculator  FRAX Risk Assessment  ICSI Preventive Guidelines  Dietary Guidelines for Americans, 2010  USDA's MyPlate  ASA Prophylaxis  Lung CA Screening    Riaz Torres MD  [facility] [place]

## 2020-07-30 NOTE — PATIENT INSTRUCTIONS
Patient Education   Personalized Prevention Plan  You are due for the preventive services outlined below.  Your care team is available to assist you in scheduling these services.  If you have already completed any of these items, please share that information with your care team to update in your medical record.  Health Maintenance Due   Topic Date Due     Discuss Advance Care Planning  1950     Hepatitis B Vaccine (1 of 3 - Risk 3-dose series) 09/10/1967     Zoster (Shingles) Vaccine (1 of 2) 09/10/1998     AORTIC ANEURYSM SCREENING (SYSTEM ASSIGNED)  09/10/2013     Eye Exam  09/01/2019     PHQ-2  01/01/2020     Annual Wellness Visit  05/07/2020     Cholesterol Lab  05/07/2020     Kidney Microalbumin Urine Test  05/07/2020     FALL RISK ASSESSMENT  05/07/2020     A1C Lab  06/06/2020

## 2020-07-31 ENCOUNTER — OFFICE VISIT (OUTPATIENT)
Dept: FAMILY MEDICINE | Facility: CLINIC | Age: 70
End: 2020-07-31
Payer: MEDICARE

## 2020-07-31 VITALS
HEART RATE: 68 BPM | OXYGEN SATURATION: 100 % | BODY MASS INDEX: 22.81 KG/M2 | DIASTOLIC BLOOD PRESSURE: 70 MMHG | TEMPERATURE: 96.5 F | RESPIRATION RATE: 14 BRPM | SYSTOLIC BLOOD PRESSURE: 132 MMHG | WEIGHT: 150 LBS

## 2020-07-31 DIAGNOSIS — E11.9 TYPE 2 DIABETES MELLITUS WITHOUT COMPLICATION, WITHOUT LONG-TERM CURRENT USE OF INSULIN (H): ICD-10-CM

## 2020-07-31 DIAGNOSIS — Z00.00 ENCOUNTER FOR MEDICARE ANNUAL WELLNESS EXAM: Primary | ICD-10-CM

## 2020-07-31 DIAGNOSIS — I10 HYPERTENSION GOAL BP (BLOOD PRESSURE) < 140/90: ICD-10-CM

## 2020-07-31 DIAGNOSIS — Z12.5 SPECIAL SCREENING FOR MALIGNANT NEOPLASM OF PROSTATE: ICD-10-CM

## 2020-07-31 DIAGNOSIS — B35.4 TINEA CORPORIS: ICD-10-CM

## 2020-07-31 LAB
ALBUMIN SERPL-MCNC: 3.9 G/DL (ref 3.4–5)
ALP SERPL-CCNC: 61 U/L (ref 40–150)
ALT SERPL W P-5'-P-CCNC: 20 U/L (ref 0–70)
ANION GAP SERPL CALCULATED.3IONS-SCNC: 6 MMOL/L (ref 3–14)
AST SERPL W P-5'-P-CCNC: 17 U/L (ref 0–45)
BILIRUB SERPL-MCNC: 0.6 MG/DL (ref 0.2–1.3)
BUN SERPL-MCNC: 8 MG/DL (ref 7–30)
CALCIUM SERPL-MCNC: 9.3 MG/DL (ref 8.5–10.1)
CHLORIDE SERPL-SCNC: 103 MMOL/L (ref 94–109)
CHOLEST SERPL-MCNC: 198 MG/DL
CO2 SERPL-SCNC: 26 MMOL/L (ref 20–32)
CREAT SERPL-MCNC: 1.06 MG/DL (ref 0.66–1.25)
CREAT UR-MCNC: 102 MG/DL
GFR SERPL CREATININE-BSD FRML MDRD: 70 ML/MIN/{1.73_M2}
GLUCOSE SERPL-MCNC: 121 MG/DL (ref 70–99)
HBA1C MFR BLD: 6.4 % (ref 0–5.6)
HDLC SERPL-MCNC: 38 MG/DL
LDLC SERPL CALC-MCNC: 133 MG/DL
MICROALBUMIN UR-MCNC: 8 MG/L
MICROALBUMIN/CREAT UR: 7.37 MG/G CR (ref 0–17)
NONHDLC SERPL-MCNC: 160 MG/DL
POTASSIUM SERPL-SCNC: 4.4 MMOL/L (ref 3.4–5.3)
PROT SERPL-MCNC: 8 G/DL (ref 6.8–8.8)
PSA SERPL-ACNC: 1.1 UG/L (ref 0–4)
SODIUM SERPL-SCNC: 135 MMOL/L (ref 133–144)
TRIGL SERPL-MCNC: 137 MG/DL

## 2020-07-31 PROCEDURE — 36415 COLL VENOUS BLD VENIPUNCTURE: CPT | Performed by: FAMILY MEDICINE

## 2020-07-31 PROCEDURE — G0439 PPPS, SUBSEQ VISIT: HCPCS | Performed by: FAMILY MEDICINE

## 2020-07-31 PROCEDURE — G0103 PSA SCREENING: HCPCS | Performed by: FAMILY MEDICINE

## 2020-07-31 PROCEDURE — 80053 COMPREHEN METABOLIC PANEL: CPT | Performed by: FAMILY MEDICINE

## 2020-07-31 PROCEDURE — 82043 UR ALBUMIN QUANTITATIVE: CPT | Performed by: FAMILY MEDICINE

## 2020-07-31 PROCEDURE — 80061 LIPID PANEL: CPT | Performed by: FAMILY MEDICINE

## 2020-07-31 PROCEDURE — 83036 HEMOGLOBIN GLYCOSYLATED A1C: CPT | Performed by: FAMILY MEDICINE

## 2020-07-31 RX ORDER — PRENATAL VIT 91/IRON/FOLIC/DHA 28-975-200
COMBINATION PACKAGE (EA) ORAL 2 TIMES DAILY
Qty: 30 G | Refills: 1 | Status: SHIPPED | OUTPATIENT
Start: 2020-07-31 | End: 2020-08-14

## 2020-07-31 RX ORDER — TRIAMCINOLONE ACETONIDE 1 MG/G
CREAM TOPICAL 2 TIMES DAILY
Qty: 30 G | Refills: 0 | Status: SHIPPED | OUTPATIENT
Start: 2020-07-31 | End: 2020-08-14

## 2020-11-29 ENCOUNTER — HEALTH MAINTENANCE LETTER (OUTPATIENT)
Age: 70
End: 2020-11-29

## 2021-02-14 ENCOUNTER — HEALTH MAINTENANCE LETTER (OUTPATIENT)
Age: 71
End: 2021-02-14

## 2021-05-11 NOTE — PROGRESS NOTES
Assessment & Plan     Type 2 diabetes mellitus without complication, without long-term current use of insulin (H)  recheck  - **A1C FUTURE anytime; Future  - **Comprehensive metabolic panel FUTURE anytime; Future  - Albumin Random Urine Quantitative with Creat Ratio; Future  - Lipid panel reflex to direct LDL Fasting; Future  - FOOT EXAM    Essential hypertension with goal blood pressure less than 140/90  Well controlled   - **Comprehensive metabolic panel FUTURE anytime; Future  - Albumin Random Urine Quantitative with Creat Ratio; Future    Thoracic back sprain, initial encounter  Worse in AM, get better after moving   try tylenol/ pino stretching., he declined PHYSICAL THERAPY   - XR Thoracic Spine 3 Views; Future    Dysuria  recheck  - **UA reflex to Microscopic FUTURE anytime; Future    Cervical pain  Reviewed ice/ home PHYSICAL THERAPY   Consider Physical Therapy and XRay studies if not improving.   C all or return to clinic prn if these symptoms worsen or fail to improve as anticipated.               Regular exercise  Follow up in 6 months.   See Patient Instructions    No follow-ups on file.    Riaz Torres MD  [facility]    Fiorella Michael is a 70 year old who presents for the following health issues     HPI     Back Pain  Onset/Duration: since winter 2020, especially when cold - on and off. Patient also experiences pressure in chest when pain is in the morning after being awake for 5-10 minutes. The pain does go away after 20 minutes after he gets up     Description:   Location of pain: middle of back left and neck left  Character of pain: numbness and burning sensation   Pain radiation: none  New numbness or weakness in legs, not attributed to pain: YES  Intensity: Currently 6/10  Progression of Symptoms: worsening  History:   Specific cause: none  Pain interferes with job: no  History of back problems: no prior back problems  Any previous MRI or X-rays: chest  "xray but not for back   Sees a specialist for back pain: No  Alleviating factors:   Improved by: heat pack   Precipitating factors:  Worsened by: cold weather and in the morning when patient wakes up  Therapies tried and outcome: heat pack     Other Concerns: Patient mentioned experiencing the following symptoms after covid-19 vaccine: high fever, weakness and fatigue, worsened back pain, and numbness that radiated from left side of neck down to left elbow    Accompanying Signs & Symptoms:  Risk of Fracture: None  Risk of Cauda Equina: None  Risk of Infection: None  Risk of Cancer: None  Risk of Ankylosing Spondylitis: Onset at age <35, male, AND morning back stiffness  no         Diabetes Follow-up    Feels \" fine\"       BP Readings from Last 2 Encounters:   05/13/21 124/70   07/31/20 132/70     Hemoglobin A1C (%)   Date Value   07/31/2020 6.4 (H)   12/06/2019 7.0 (H)     LDL Cholesterol Calculated (mg/dL)   Date Value   07/31/2020 133 (H)   05/07/2019 147 (H)         Hyperlipidemia Follow-Up      Are you regularly taking any medication or supplement to lower your cholesterol?   No    Are you having muscle aches or other side effects that you think could be caused by your cholesterol lowering medication?  No    Hypertension Follow-up      Do you check your blood pressure regularly outside of the clinic? Yes     Are you following a low salt diet? Yes    Are your blood pressures ever more than 140 on the top number (systolic) OR more   than 90 on the bottom number (diastolic), for example 140/90? No      How many servings of fruits and vegetables do you eat daily?  2-3    On average, how many sweetened beverages do you drink each day (Examples: soda, juice, sweet tea, etc.  Do NOT count diet or artificially sweetened beverages)?   0    How many days per week do you exercise enough to make your heart beat faster? 5    How many minutes a day do you exercise enough to make your heart beat faster? 20 - 29    How many " "days per week do you miss taking your medication? 0      Review of Systems   Constitutional, HEENT, cardiovascular, pulmonary, gi and gu systems are negative, except as otherwise noted.      Objective    /70   Pulse 78   Temp 96.7  F (35.9  C) (Temporal)   Resp 18   Ht 1.702 m (5' 7\")   Wt 68.9 kg (152 lb)   SpO2 98%   BMI 23.81 kg/m    Body mass index is 23.81 kg/m .  Physical Exam   GENERAL: healthy, alert and no distress  EYES: Eyes grossly normal to inspection, PERRL and conjunctivae and sclerae normal  HENT: ear canals and TM's normal, nose and mouth without ulcers or lesions  NECK: no adenopathy, no asymmetry, masses, or scars and thyroid normal to palpation  RESP: lungs clear to auscultation - no rales, rhonchi or wheezes  CV: regular rate and rhythm, normal S1 S2, no S3 or S4, no murmur, click or rub, no peripheral edema and peripheral pulses strong  ABDOMEN: soft, nontender, no hepatosplenomegaly, no masses and bowel sounds normal  MS: normal muscle tone and tenderness to palpation thoracic paraspinal muscles and    Neck exam: tenderness over lower cervical spine and nuchal area, tenderness over trapezial muscles, normal neurological exam of arms; normal DTR's, motor, sensory exam.  X-Ray: ordered, but results not yet available.    SKIN: no suspicious lesions or rashes  NEURO: Normal strength and tone, mentation intact and speech normal  PSYCH: mentation appears normal, affect normal/bright  LYMPH: no cervical, supraclavicular, axillary, or inguinal adenopathy  Diabetic foot exam: normal DP and PT pulses, no trophic changes or ulcerative lesions and normal sensory exam    Office Visit on 07/31/2020   Component Date Value Ref Range Status     Cholesterol 07/31/2020 198  <200 mg/dL Final     Triglycerides 07/31/2020 137  <150 mg/dL Final    Non Fasting     HDL Cholesterol 07/31/2020 38* >39 mg/dL Final     LDL Cholesterol Calculated 07/31/2020 133* <100 mg/dL Final    Comment: Above desirable:  " 100-129 mg/dl  Borderline High:  130-159 mg/dL  High:             160-189 mg/dL  Very high:       >189 mg/dl       Non HDL Cholesterol 07/31/2020 160* <130 mg/dL Final    Comment: Above Desirable:  130-159 mg/dl  Borderline high:  160-189 mg/dl  High:             190-219 mg/dl  Very high:       >219 mg/dl       Sodium 07/31/2020 135  133 - 144 mmol/L Final     Potassium 07/31/2020 4.4  3.4 - 5.3 mmol/L Final     Chloride 07/31/2020 103  94 - 109 mmol/L Final     Carbon Dioxide 07/31/2020 26  20 - 32 mmol/L Final     Anion Gap 07/31/2020 6  3 - 14 mmol/L Final     Glucose 07/31/2020 121* 70 - 99 mg/dL Final    Non Fasting     Urea Nitrogen 07/31/2020 8  7 - 30 mg/dL Final     Creatinine 07/31/2020 1.06  0.66 - 1.25 mg/dL Final     GFR Estimate 07/31/2020 70  >60 mL/min/[1.73_m2] Final    Comment: Non  GFR Calc  Starting 12/18/2018, serum creatinine based estimated GFR (eGFR) will be   calculated using the Chronic Kidney Disease Epidemiology Collaboration   (CKD-EPI) equation.       GFR Estimate If Black 07/31/2020 81  >60 mL/min/[1.73_m2] Final    Comment:  GFR Calc  Starting 12/18/2018, serum creatinine based estimated GFR (eGFR) will be   calculated using the Chronic Kidney Disease Epidemiology Collaboration   (CKD-EPI) equation.       Calcium 07/31/2020 9.3  8.5 - 10.1 mg/dL Final     Bilirubin Total 07/31/2020 0.6  0.2 - 1.3 mg/dL Final     Albumin 07/31/2020 3.9  3.4 - 5.0 g/dL Final     Protein Total 07/31/2020 8.0  6.8 - 8.8 g/dL Final     Alkaline Phosphatase 07/31/2020 61  40 - 150 U/L Final     ALT 07/31/2020 20  0 - 70 U/L Final     AST 07/31/2020 17  0 - 45 U/L Final     PSA 07/31/2020 1.10  0 - 4 ug/L Final    Assay Method:  Chemiluminescence using Siemens Vista analyzer     Hemoglobin A1C 07/31/2020 6.4* 0 - 5.6 % Final    Comment: Normal <5.7% Prediabetes 5.7-6.4%  Diabetes 6.5% or higher - adopted from ADA   consensus guidelines.       Creatinine Urine 07/31/2020 102   mg/dL Final     Albumin Urine mg/L 07/31/2020 8  mg/L Final     Albumin Urine mg/g Cr 07/31/2020 7.37  0 - 17 mg/g Cr Final

## 2021-05-13 ENCOUNTER — OFFICE VISIT (OUTPATIENT)
Dept: FAMILY MEDICINE | Facility: CLINIC | Age: 71
End: 2021-05-13
Payer: MEDICARE

## 2021-05-13 ENCOUNTER — HOSPITAL ENCOUNTER (OUTPATIENT)
Dept: GENERAL RADIOLOGY | Facility: CLINIC | Age: 71
Discharge: HOME | End: 2021-05-13
Attending: FAMILY MEDICINE | Admitting: FAMILY MEDICINE
Payer: MEDICARE

## 2021-05-13 VITALS
HEIGHT: 67 IN | OXYGEN SATURATION: 98 % | RESPIRATION RATE: 18 BRPM | TEMPERATURE: 96.7 F | BODY MASS INDEX: 23.86 KG/M2 | HEART RATE: 78 BPM | WEIGHT: 152 LBS | SYSTOLIC BLOOD PRESSURE: 124 MMHG | DIASTOLIC BLOOD PRESSURE: 70 MMHG

## 2021-05-13 DIAGNOSIS — I10 ESSENTIAL HYPERTENSION WITH GOAL BLOOD PRESSURE LESS THAN 140/90: ICD-10-CM

## 2021-05-13 DIAGNOSIS — R30.0 DYSURIA: ICD-10-CM

## 2021-05-13 DIAGNOSIS — S23.9XXA THORACIC BACK SPRAIN, INITIAL ENCOUNTER: ICD-10-CM

## 2021-05-13 DIAGNOSIS — E11.9 TYPE 2 DIABETES MELLITUS WITHOUT COMPLICATION, WITHOUT LONG-TERM CURRENT USE OF INSULIN (H): Primary | ICD-10-CM

## 2021-05-13 DIAGNOSIS — M54.2 CERVICAL PAIN: ICD-10-CM

## 2021-05-13 DIAGNOSIS — E11.9 TYPE 2 DIABETES MELLITUS WITHOUT COMPLICATION, WITHOUT LONG-TERM CURRENT USE OF INSULIN (H): ICD-10-CM

## 2021-05-13 LAB
ALBUMIN SERPL-MCNC: 4.1 G/DL (ref 3.4–5)
ALBUMIN UR-MCNC: NEGATIVE MG/DL
ALP SERPL-CCNC: 59 U/L (ref 40–150)
ALT SERPL W P-5'-P-CCNC: 24 U/L (ref 0–70)
ANION GAP SERPL CALCULATED.3IONS-SCNC: 3 MMOL/L (ref 3–14)
APPEARANCE UR: CLEAR
AST SERPL W P-5'-P-CCNC: 18 U/L (ref 0–45)
BILIRUB SERPL-MCNC: 0.7 MG/DL (ref 0.2–1.3)
BILIRUB UR QL STRIP: NEGATIVE
BUN SERPL-MCNC: 13 MG/DL (ref 7–30)
CALCIUM SERPL-MCNC: 9.1 MG/DL (ref 8.5–10.1)
CHLORIDE SERPL-SCNC: 103 MMOL/L (ref 94–109)
CHOLEST SERPL-MCNC: 256 MG/DL
CO2 SERPL-SCNC: 30 MMOL/L (ref 20–32)
COLOR UR AUTO: YELLOW
CREAT SERPL-MCNC: 1.05 MG/DL (ref 0.66–1.25)
CREAT UR-MCNC: 118 MG/DL
GFR SERPL CREATININE-BSD FRML MDRD: 70 ML/MIN/{1.73_M2}
GLUCOSE SERPL-MCNC: 168 MG/DL (ref 70–99)
GLUCOSE UR STRIP-MCNC: NEGATIVE MG/DL
HBA1C MFR BLD: 7 % (ref 0–5.6)
HDLC SERPL-MCNC: 50 MG/DL
HGB UR QL STRIP: NEGATIVE
KETONES UR STRIP-MCNC: NEGATIVE MG/DL
LDLC SERPL CALC-MCNC: 172 MG/DL
LEUKOCYTE ESTERASE UR QL STRIP: NEGATIVE
MICROALBUMIN UR-MCNC: 26 MG/L
MICROALBUMIN/CREAT UR: 22.29 MG/G CR (ref 0–17)
NITRATE UR QL: NEGATIVE
NONHDLC SERPL-MCNC: 206 MG/DL
PH UR STRIP: 6 PH (ref 5–7)
POTASSIUM SERPL-SCNC: 4.3 MMOL/L (ref 3.4–5.3)
PROT SERPL-MCNC: 8 G/DL (ref 6.8–8.8)
SODIUM SERPL-SCNC: 136 MMOL/L (ref 133–144)
SOURCE: NORMAL
SP GR UR STRIP: 1.02 (ref 1–1.03)
TRIGL SERPL-MCNC: 171 MG/DL
UROBILINOGEN UR STRIP-ACNC: 0.2 EU/DL (ref 0.2–1)

## 2021-05-13 PROCEDURE — 72072 X-RAY EXAM THORAC SPINE 3VWS: CPT | Mod: 26 | Performed by: STUDENT IN AN ORGANIZED HEALTH CARE EDUCATION/TRAINING PROGRAM

## 2021-05-13 PROCEDURE — 81003 URINALYSIS AUTO W/O SCOPE: CPT | Performed by: FAMILY MEDICINE

## 2021-05-13 PROCEDURE — 80061 LIPID PANEL: CPT | Performed by: FAMILY MEDICINE

## 2021-05-13 PROCEDURE — 82043 UR ALBUMIN QUANTITATIVE: CPT | Performed by: FAMILY MEDICINE

## 2021-05-13 PROCEDURE — 83036 HEMOGLOBIN GLYCOSYLATED A1C: CPT | Performed by: FAMILY MEDICINE

## 2021-05-13 PROCEDURE — 99214 OFFICE O/P EST MOD 30 MIN: CPT | Performed by: FAMILY MEDICINE

## 2021-05-13 PROCEDURE — 99207 PR FOOT EXAM NO CHARGE: CPT | Performed by: FAMILY MEDICINE

## 2021-05-13 PROCEDURE — 80053 COMPREHEN METABOLIC PANEL: CPT | Performed by: FAMILY MEDICINE

## 2021-05-13 PROCEDURE — 72072 X-RAY EXAM THORAC SPINE 3VWS: CPT

## 2021-05-13 PROCEDURE — 36415 COLL VENOUS BLD VENIPUNCTURE: CPT | Performed by: FAMILY MEDICINE

## 2021-05-13 ASSESSMENT — MIFFLIN-ST. JEOR: SCORE: 1398.1

## 2021-05-17 ENCOUNTER — TELEPHONE (OUTPATIENT)
Dept: FAMILY MEDICINE | Facility: CLINIC | Age: 71
End: 2021-05-17

## 2021-05-17 NOTE — TELEPHONE ENCOUNTER
Pt called back - informed of lab result note.    Pt stated he wants to work on diet and exercise before trying a statin. Will follow-up in 6 months.    Gauri Gayle RN  [facility]

## 2021-05-17 NOTE — TELEPHONE ENCOUNTER
Left VM for pt to call back and ask to speak to nurse regarding following lab result note:    Please call patient  lab are  EXCEPT LDL at 172 so I would recommend starting  or trying a statin  Also A1C up to 7  Needs to work harder on diet/ exercise recheck 6 months    Gauri Gayle, RN  [facility]

## 2021-09-25 ENCOUNTER — HEALTH MAINTENANCE LETTER (OUTPATIENT)
Age: 71
End: 2021-09-25

## 2021-11-02 NOTE — PROGRESS NOTES
History:  Gareth Barraza comes in today for pre-operative evaluation for none assisted right knee arthroplasty revision (full). We discuss the risks, benefits, expected outcomes and possible complications including intraoperative blood loss, postoperative blood clot, infection and failure to thrive.  The patient's questions are answered and he elects to proceed.         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 Kvng Bonds   • KNEE ARTHROSCOPY W/ MENISCAL REPAIR       • PROSTATE SURGERY             No Known Allergies   Social History           Tobacco Use   • Smoking status: Never Smoker   • Smokeless tobacco: Never Used   Substance Use Topics   • Alcohol use: Not on file   • Drug use: Not on file      The [facility] site will be consulted for medication use.     Physical Exam:  Gareth Barraza is a 84 y.o. male who appears his stated age, he has an Estimated body mass index is 24.02 kg/m² as calculated from the following:    Height as of 7/27/21: 5' 8\" (1.727 m).    Weight as of 7/27/21: 158 lb (71.7 kg)..  Patient is alert and oriented times 3,  Subjective     Alec Hanson is a 69 year old male who presents to clinic today for the following health issues:    HPI   Acute Illness   Acute illness concerns: Possible  Infection of lungs  Onset: 3 weeks    Fever: YES- only for one day    Chills/Sweats: no    Headache (location?): YES- forehead    Sinus Pressure:no    Conjunctivitis:  no    Ear Pain: YES: both    Rhinorrhea: no    Congestion: YES    Sore Throat: YES     Cough: YES-productive of yellow, black and thick sputum, not sleeping    Wheeze: no    Decreased Appetite: YES    Nausea: no    Vomiting: no    Diarrhea:  no    Dysuria/Freq.: no    Fatigue/Achiness: no    Sick/Strep Exposure: no     Therapies Tried and outcome: [place] seltzer cold  Had flu shot and pneumovax    Diabetes Follow-up      How often are you checking your blood sugar? Not at all    What concerns do you have today about your diabetes? None     Do you have any of these symptoms? (Select all that apply)  No numbness or tingling in feet.  No redness, sores or blisters on feet.  No complaints of excessive thirst.  No reports of blurry vision.  No significant changes to weight.     Have you had a diabetic eye exam in the last 12 months? No    BP Readings from Last 2 Encounters:   12/06/19 (!) 150/82   10/17/19 136/82     Hemoglobin A1C (%)   Date Value   12/06/2019 7.0 (H)   05/07/2019 6.4 (H)     LDL Cholesterol Calculated (mg/dL)   Date Value   05/07/2019 147 (H)   11/19/2018 160 (H)       Diabetes Management Resources      Current Outpatient Medications   Medication Sig Dispense Refill     aspirin (ASA) 81 MG tablet Take 1 tablet (81 mg) by mouth daily 90 tablet 3     azithromycin (ZITHROMAX) 250 MG tablet Take 2 tablets (500 mg) by mouth daily for 1 day, THEN 1 tablet (250 mg) daily for 4 days. 6 tablet 0     blood glucose monitoring (MIKE MICROLET) lancets Use to test blood sugar 2 times daily or as directed. 1 Box 3     Cholecalciferol (VITAMIN D PO)        guaiFENesin-codeine (ROBITUSSIN  AC) 100-10 MG/5ML solution Take 5-10 mLs by mouth every 6 hours as needed for cough 473 mL 0     metoprolol succinate ER (TOPROL-XL) 25 MG 24 hr tablet Take 1 tablet (25 mg) by mouth daily (Patient not taking: Reported on 10/17/2019) 90 tablet 3     polyethylene glycol (MIRALAX/GLYCOLAX) packet Take 17 g by mouth daily (Patient not taking: Reported on 10/17/2019) 100 packet 3     traZODone (DESYREL) 50 MG tablet Take 1 tablet (50 mg) by mouth At Bedtime (Patient not taking: Reported on 10/17/2019) 30 tablet 3         Reviewed and updated as needed this visit by Provider         Review of Systems   ROS COMP: Constitutional, HEENT, cardiovascular, pulmonary, gi and gu systems are negative, except as otherwise noted.      Objective    BP (!) 150/82   Pulse 73   Temp 97.6  F (36.4  C) (Oral)   Wt 70.3 kg (155 lb)   SpO2 98%   BMI 23.57 kg/m    Body mass index is 23.57 kg/m .  Physical Exam   GENERAL: alert and fatigued  NECK: no adenopathy, no asymmetry, masses, or scars and thyroid normal to palpation  RESP: no rales , no rhonchi and rhonchi bibasilar  CV: regular rate and rhythm, normal S1 S2, no S3 or S4, no murmur, click or rub, no peripheral edema and peripheral pulses strong  ABDOMEN: soft, nontender, no hepatosplenomegaly, no masses and bowel sounds normal  MS: no gross musculoskeletal defects noted, no edema  PSYCH: mentation appears normal, affect normal/bright  Diabetic foot exam: normal DP and PT pulses, no trophic changes or ulcerative lesions and normal sensory exam    Diagnostic Test Results:  Labs reviewed in Epic  Results for orders placed or performed in visit on 12/06/19 (from the past 24 hour(s))   CBC with platelets   Result Value Ref Range    WBC 6.6 4.0 - 11.0 10e9/L    RBC Count 4.13 (L) 4.4 - 5.9 10e12/L    Hemoglobin 13.1 (L) 13.3 - 17.7 g/dL    Hematocrit 37.7 (L) 40.0 - 53.0 %    MCV 91 78 - 100 fl    MCH 31.7 26.5 - 33.0 pg    MCHC 34.7 31.5 - 36.5 g/dL    RDW 12.7 10.0 - 15.0 %     breathing is regular, no distress.  The patient's gait is Antalgic. Pain with motion of the joint.  Knee ROM is 5-95 Skin is intact, palpable pulses and grossly intact motor-sensory exam.     X-Rays: === 07/27/21 ===     XR KNEE 4 VIEWS RIGHT     - Narrative -  4 views AP/Lateral/45 degree PA, and skyline of the right knee taken  7/27/2021 were reviewed.  These demonstrate a right total knee arthroplasty.  AP views show a small  radiolucent line of the medial tibia.  Lateral view demonstrates a erosive  scalp lesion of the anterior cortex that was not present on radiographs  reviewed from 2010.  Radiolucent line around the femoral component is  noted.  [place] view demonstrates satisfactory patellar position.  Ectopic  bone formation noted around the knee.     Impression:  Advanced Osteoarthritis of right knee     Plan:  We will proceed with the planned surgery. Tranexamic acid (TXA) will be used topically during surgery. We will use plavix postoperatively based on a recommendation from the [employer]. After surgery the patient will go home POD2. The patient understands where to be washing with the Hibiclens soap preoperatively, and will call us with any further concerns or questions.  Wants general anesthesia secondary to his history of Neg Pressure Pulm Edema.     Platelet Count 283 150 - 450 10e9/L   Hemoglobin A1c   Result Value Ref Range    Hemoglobin A1C 7.0 (H) 0 - 5.6 %           Assessment & Plan     1. Pneumonia of both lower lobes due to infectious organism (H)  worsening  - CBC with platelets  - azithromycin (ZITHROMAX) 250 MG tablet; Take 2 tablets (500 mg) by mouth daily for 1 day, THEN 1 tablet (250 mg) daily for 4 days.  Dispense: 6 tablet; Refill: 0  - guaiFENesin-codeine (ROBITUSSIN AC) 100-10 MG/5ML solution; Take 5-10 mLs by mouth every 6 hours as needed for cough  Dispense: 473 mL; Refill: 0    2. Controlled type 2 diabetes mellitus without complication, without long-term current use of insulin (H)  Well controlled   - Comprehensive metabolic panel  - Hemoglobin A1c       Regular exercise  See Patient Instructions    No follow-ups on file.    Riaz Torres MD  [facility] [place]

## 2021-11-20 ENCOUNTER — HEALTH MAINTENANCE LETTER (OUTPATIENT)
Age: 71
End: 2021-11-20

## 2022-05-03 ENCOUNTER — TRANSFERRED RECORDS (OUTPATIENT)
Dept: HEALTH INFORMATION MANAGEMENT | Facility: CLINIC | Age: 72
End: 2022-05-03
Payer: MEDICARE

## 2022-05-03 LAB — RETINOPATHY: POSITIVE

## 2022-05-31 ENCOUNTER — OFFICE VISIT (OUTPATIENT)
Dept: FAMILY MEDICINE | Facility: CLINIC | Age: 72
End: 2022-05-31
Payer: MEDICARE

## 2022-05-31 VITALS — TEMPERATURE: 97 F | OXYGEN SATURATION: 99 % | HEART RATE: 73 BPM

## 2022-05-31 DIAGNOSIS — Z20.822 EXPOSURE TO 2019 NOVEL CORONAVIRUS: ICD-10-CM

## 2022-05-31 DIAGNOSIS — J06.9 VIRAL URI WITH COUGH: Primary | ICD-10-CM

## 2022-05-31 PROCEDURE — U0005 INFEC AGEN DETEC AMPLI PROBE: HCPCS | Performed by: FAMILY MEDICINE

## 2022-05-31 PROCEDURE — 99214 OFFICE O/P EST MOD 30 MIN: CPT | Mod: CS | Performed by: FAMILY MEDICINE

## 2022-05-31 PROCEDURE — U0003 INFECTIOUS AGENT DETECTION BY NUCLEIC ACID (DNA OR RNA); SEVERE ACUTE RESPIRATORY SYNDROME CORONAVIRUS 2 (SARS-COV-2) (CORONAVIRUS DISEASE [COVID-19]), AMPLIFIED PROBE TECHNIQUE, MAKING USE OF HIGH THROUGHPUT TECHNOLOGIES AS DESCRIBED BY CMS-2020-01-R: HCPCS | Performed by: FAMILY MEDICINE

## 2022-05-31 ASSESSMENT — ENCOUNTER SYMPTOMS
SORE THROAT: 1
HEADACHES: 1
WEAKNESS: 1
COUGH: 1
DIZZINESS: 1

## 2022-05-31 ASSESSMENT — ACTIVITIES OF DAILY LIVING (ADL): CURRENT_FUNCTION: NO ASSISTANCE NEEDED

## 2022-05-31 NOTE — PROGRESS NOTES
Assessment & Plan      patient upset we could not do Medicare Physical/ follow up today as he was ill  I had to repeatly ask him to leave/ go home and rest    Viral URI with cough  Likely Covid  Rest, fluids   call ASAP  If worse  - Symptomatic; Yes; 5/24/2022 COVID-19 Virus (Coronavirus) by PCR Nose; Future  - Symptomatic; Yes; 5/24/2022 COVID-19 Virus (Coronavirus) by PCR Nose    Exposure to 2019 novel coronavirus  As above   urged booster  - Symptomatic; Yes; 5/24/2022 COVID-19 Virus (Coronavirus) by PCR Nose; Future  - Symptomatic; Yes; 5/24/2022 COVID-19 Virus (Coronavirus) by PCR Nose                 Return in about 53 weeks (around 6/6/2023) for Annual Wellness Visit.    Riaz Torres MD  [facility] DOROTA Michael is a 71 year old who presents for the following health issues     HPI     Acute Illness  Acute illness concerns: uri  Onset/Duration: 7 days   was exposed to covid 10-12 days ago ( wedding)  Symptoms:  Fever: no  Chills/Sweats: YES  Headache (location?): no  Sinus Pressure: no  Conjunctivitis:  no  Ear Pain: no  Rhinorrhea: YES  Congestion: YES  Sore Throat: YES  Cough: YES-non-productive  Wheeze: no  Decreased Appetite: YES  Nausea: no  Vomiting: no  Diarrhea: YES  Dysuria/Freq.: no  Dysuria or Hematuria: no  Fatigue/Achiness: YES  Sick/Strep Exposure: YES  Therapies tried and outcome: None      Review of Systems   Constitutional, HEENT, cardiovascular, pulmonary, gi and gu systems are negative, except as otherwise noted.      Objective    Pulse 73   Temp 97  F (36.1  C) (Temporal)   SpO2 99%   There is no height or weight on file to calculate BMI.  Physical Exam   GENERAL: alert, no distress and fatigued  EYES: Eyes grossly normal to inspection, PERRL and conjunctivae and sclerae normal  HENT: normal cephalic/atraumatic, ear canals and TM's normal, nose and mouth without ulcers or lesions, nasal mucosa edematous , rhinorrhea clear, oropharynx clear  and oral mucous membranes moist  RESP: lungs clear to auscultation - no rales, rhonchi or wheezes  CV: regular rate and rhythm, normal S1 S2, no S3 or S4, no murmur, click or rub, no peripheral edema and peripheral pulses strong  NEURO: Normal strength and tone, mentation intact and speech normal    Transferred Records on 05/03/2022   Component Date Value Ref Range Status     RETINOPATHY 05/03/2022 POSITIVE (A)  Final

## 2022-05-31 NOTE — PATIENT INSTRUCTIONS
Patient Education   Personalized Prevention Plan  You are due for the preventive services outlined below.  Your care team is available to assist you in scheduling these services.  If you have already completed any of these items, please share that information with your care team to update in your medical record.  Health Maintenance Due   Topic Date Due     ANNUAL REVIEW OF HM ORDERS  Never done     Zoster (Shingles) Vaccine (1 of 2) Never done     AORTIC ANEURYSM SCREENING (SYSTEM ASSIGNED)  Never done     FALL RISK ASSESSMENT  07/31/2021     A1C Lab  11/13/2021     PHQ-2 (once per calendar year)  01/01/2022     COVID-19 Vaccine (4 - Booster for Moderna series) 04/21/2022     Basic Metabolic Panel  05/13/2022     Cholesterol Lab  05/13/2022     Kidney Microalbumin Urine Test  05/13/2022     Diabetic Foot Exam  05/13/2022

## 2022-06-01 ENCOUNTER — NURSE TRIAGE (OUTPATIENT)
Dept: FAMILY MEDICINE | Facility: CLINIC | Age: 72
End: 2022-06-01
Payer: MEDICARE

## 2022-06-01 DIAGNOSIS — J06.9 VIRAL URI WITH COUGH: Primary | ICD-10-CM

## 2022-06-01 DIAGNOSIS — J20.9 ACUTE BRONCHITIS WITH SYMPTOMS > 10 DAYS: ICD-10-CM

## 2022-06-01 LAB — SARS-COV-2 RNA RESP QL NAA+PROBE: NEGATIVE

## 2022-06-01 NOTE — TELEPHONE ENCOUNTER
"Patient calling to report continued productive cough with copious yellow phlegm. Denies increase WOB or CP. Covid test pending from yesterday's visit with PCP. Patient is requesting work release letter as PCP told him to rest for one month. If appropriate, please mail letter to patient's home.     Esperanza Bassett RN      Reason for Disposition    Cough with no complications    Answer Assessment - Initial Assessment Questions  1. ONSET: \"When did the cough begin?\"       About a week  2. SEVERITY: \"How bad is the cough today?\"       Mild-moderate  3. RESPIRATORY DISTRESS: \"Describe your breathing.\"       No increased WOB  4. FEVER: \"Do you have a fever?\" If so, ask: \"What is your temperature, how was it measured, and when did it start?\"      No fever   5. HEMOPTYSIS: \"Are you coughing up any blood?\" If so ask: \"How much?\" (flecks, streaks, tablespoons, etc.)      No-just yellow thick phlegm  6. TREATMENT: \"What have you done so far to treat the cough?\" (e.g., meds, fluids, humidifier)      Hot tea, recommended using a humidifer  7. CARDIAC HISTORY: \"Do you have any history of heart disease?\" (e.g., heart attack, congestive heart failure)       No  8. LUNG HISTORY: \"Do you have any history of lung disease?\"  (e.g., pulmonary embolus, asthma, emphysema)      Hx of TB-seen yesterday in clinic  9. PE RISK FACTORS: \"Do you have a history of blood clots?\" (or: recent major surgery, recent prolonged travel, bedridden)      No  10. OTHER SYMPTOMS: \"Do you have any other symptoms? (e.g., runny nose, wheezing, chest pain)        Runny nose, fatigued  11. PREGNANCY: \"Is there any chance you are pregnant?\" \"When was your last menstrual period?\"        N/A  12. TRAVEL: \"Have you traveled out of the country in the last month?\" (e.g., travel history, exposures)        No    Protocols used: COUGH-A-OH      "

## 2022-06-01 NOTE — LETTER
[facility]  [address]  [address]  [facility] [address]  [phone]          June 1, 2022    RE:  Alec Hanson                                                                                                                                                       [address] [place] [address]            To whom it may concern:    Alec Hanson is under my professional care for Viral URI with cough .   He should rest at home for the rest of this week and may return to work next week if improving.      Sincerely,        Riaz Torres MD

## 2022-06-02 NOTE — TELEPHONE ENCOUNTER
I told him to rest for the rest of the week, not a month  I told that if it was covid some people can take several weeks to recover  But it is not covid  Please call him

## 2022-06-09 RX ORDER — AZITHROMYCIN 250 MG/1
TABLET, FILM COATED ORAL
Qty: 6 TABLET | Refills: 0 | Status: SHIPPED | OUTPATIENT
Start: 2022-06-09 | End: 2022-06-14

## 2022-06-09 NOTE — TELEPHONE ENCOUNTER
hav e him try   Orders Placed This Encounter     azithromycin (ZITHROMAX) 250 MG tablet     Sig: Take 2 tablets (500 mg) by mouth daily for 1 day, THEN 1 tablet (250 mg) daily for 4 days.     Dispense:  6 tablet     Refill:  0

## 2022-06-09 NOTE — TELEPHONE ENCOUNTER
"LM,   Patient calling to f/u on URI  Saw you 5/31/2022  Still struggling with symptoms   Hard time sleeping   Also still coughing up \"this ugly stuff\" - sometimes phlegm white, black, yellow - varies in color  Coughing every 5-10 minutes  Still having chest irritation too   Afebrile   Wants to know next steps  Pended pharmacy if you want to   Please advise  Thanks,  Lizeth LIEBERMAN RN    Call pt back at [phone]   If no answer, detailed VM is ok    "

## 2022-06-09 NOTE — TELEPHONE ENCOUNTER
Left detailed message on pt vm, antibiotic sent, follow instructions on dosing    Linda Domínguez RN   [facility]

## 2022-07-02 ENCOUNTER — HEALTH MAINTENANCE LETTER (OUTPATIENT)
Age: 72
End: 2022-07-02

## 2022-10-31 ENCOUNTER — HOSPITAL ENCOUNTER (EMERGENCY)
Facility: CLINIC | Age: 72
Discharge: HOME | End: 2022-10-31
Attending: EMERGENCY MEDICINE | Admitting: EMERGENCY MEDICINE
Payer: MEDICARE

## 2022-10-31 ENCOUNTER — APPOINTMENT (OUTPATIENT)
Dept: MRI IMAGING | Facility: CLINIC | Age: 72
End: 2022-10-31
Attending: EMERGENCY MEDICINE
Payer: MEDICARE

## 2022-10-31 ENCOUNTER — APPOINTMENT (OUTPATIENT)
Dept: CT IMAGING | Facility: CLINIC | Age: 72
End: 2022-10-31
Attending: EMERGENCY MEDICINE
Payer: MEDICARE

## 2022-10-31 VITALS
WEIGHT: 150.7 LBS | RESPIRATION RATE: 16 BRPM | OXYGEN SATURATION: 99 % | HEART RATE: 68 BPM | DIASTOLIC BLOOD PRESSURE: 75 MMHG | SYSTOLIC BLOOD PRESSURE: 183 MMHG | HEIGHT: 67 IN | TEMPERATURE: 97.6 F | BODY MASS INDEX: 23.65 KG/M2

## 2022-10-31 DIAGNOSIS — R51.9 NONINTRACTABLE HEADACHE, UNSPECIFIED CHRONICITY PATTERN, UNSPECIFIED HEADACHE TYPE: ICD-10-CM

## 2022-10-31 DIAGNOSIS — H49.22 LEFT ABDUCENS NERVE PALSY: ICD-10-CM

## 2022-10-31 LAB
ABO/RH(D): NORMAL
ALBUMIN UR-MCNC: NEGATIVE MG/DL
ANION GAP SERPL CALCULATED.3IONS-SCNC: 7 MMOL/L (ref 3–14)
ANTIBODY SCREEN: NEGATIVE
APPEARANCE UR: CLEAR
APTT PPP: 26 SECONDS (ref 22–38)
BASOPHILS # BLD AUTO: 0 10E3/UL (ref 0–0.2)
BASOPHILS NFR BLD AUTO: 0 %
BILIRUB UR QL STRIP: NEGATIVE
BUN SERPL-MCNC: 9 MG/DL (ref 7–30)
CALCIUM SERPL-MCNC: 9.1 MG/DL (ref 8.5–10.1)
CHLORIDE BLD-SCNC: 103 MMOL/L (ref 94–109)
CO2 SERPL-SCNC: 27 MMOL/L (ref 20–32)
COLOR UR AUTO: ABNORMAL
CREAT BLD-MCNC: 1.1 MG/DL (ref 0.7–1.3)
CREAT SERPL-MCNC: 1.09 MG/DL (ref 0.66–1.25)
CRP SERPL-MCNC: <2.9 MG/L (ref 0–8)
EOSINOPHIL # BLD AUTO: 0 10E3/UL (ref 0–0.7)
EOSINOPHIL NFR BLD AUTO: 1 %
ERYTHROCYTE [DISTWIDTH] IN BLOOD BY AUTOMATED COUNT: 12.8 % (ref 10–15)
ERYTHROCYTE [SEDIMENTATION RATE] IN BLOOD BY WESTERGREN METHOD: 7 MM/HR (ref 0–20)
GFR SERPL CREATININE-BSD FRML MDRD: 71 ML/MIN/1.73M2
GFR SERPL CREATININE-BSD FRML MDRD: >60 ML/MIN/1.73M2
GLUCOSE BLD-MCNC: 176 MG/DL (ref 70–99)
GLUCOSE UR STRIP-MCNC: 200 MG/DL
HBA1C MFR BLD: 7.1 % (ref 0–5.6)
HCT VFR BLD AUTO: 40.2 % (ref 40–53)
HGB BLD-MCNC: 13.9 G/DL (ref 13.3–17.7)
HGB UR QL STRIP: NEGATIVE
IMM GRANULOCYTES # BLD: 0 10E3/UL
IMM GRANULOCYTES NFR BLD: 0 %
INR PPP: 1.04 (ref 0.85–1.15)
KETONES UR STRIP-MCNC: NEGATIVE MG/DL
LEUKOCYTE ESTERASE UR QL STRIP: NEGATIVE
LYMPHOCYTES # BLD AUTO: 1.3 10E3/UL (ref 0.8–5.3)
LYMPHOCYTES NFR BLD AUTO: 28 %
MCH RBC QN AUTO: 31.7 PG (ref 26.5–33)
MCHC RBC AUTO-ENTMCNC: 34.6 G/DL (ref 31.5–36.5)
MCV RBC AUTO: 92 FL (ref 78–100)
MONOCYTES # BLD AUTO: 0.4 10E3/UL (ref 0–1.3)
MONOCYTES NFR BLD AUTO: 8 %
MUCOUS THREADS #/AREA URNS LPF: PRESENT /LPF
NEUTROPHILS # BLD AUTO: 2.9 10E3/UL (ref 1.6–8.3)
NEUTROPHILS NFR BLD AUTO: 63 %
NITRATE UR QL: NEGATIVE
NRBC # BLD AUTO: 0 10E3/UL
NRBC BLD AUTO-RTO: 0 /100
PH UR STRIP: 5 [PH] (ref 5–7)
PLATELET # BLD AUTO: 225 10E3/UL (ref 150–450)
POTASSIUM BLD-SCNC: 4 MMOL/L (ref 3.4–5.3)
RBC # BLD AUTO: 4.38 10E6/UL (ref 4.4–5.9)
RBC URINE: 1 /HPF
SODIUM SERPL-SCNC: 137 MMOL/L (ref 133–144)
SP GR UR STRIP: 1.01 (ref 1–1.03)
SPECIMEN EXPIRATION DATE: NORMAL
TROPONIN I SERPL HS-MCNC: 6 NG/L
UROBILINOGEN UR STRIP-MCNC: NORMAL MG/DL
WBC # BLD AUTO: 4.6 10E3/UL (ref 4–11)
WBC URINE: <1 /HPF

## 2022-10-31 PROCEDURE — 85025 COMPLETE CBC W/AUTO DIFF WBC: CPT | Performed by: EMERGENCY MEDICINE

## 2022-10-31 PROCEDURE — 81001 URINALYSIS AUTO W/SCOPE: CPT | Performed by: EMERGENCY MEDICINE

## 2022-10-31 PROCEDURE — 258N000003 HC RX IP 258 OP 636: Performed by: EMERGENCY MEDICINE

## 2022-10-31 PROCEDURE — 250N000011 HC RX IP 250 OP 636: Performed by: EMERGENCY MEDICINE

## 2022-10-31 PROCEDURE — 99207 PR NO BILLABLE SERVICE THIS VISIT: CPT | Performed by: STUDENT IN AN ORGANIZED HEALTH CARE EDUCATION/TRAINING PROGRAM

## 2022-10-31 PROCEDURE — 86140 C-REACTIVE PROTEIN: CPT | Performed by: EMERGENCY MEDICINE

## 2022-10-31 PROCEDURE — 255N000002 HC RX 255 OP 636: Performed by: EMERGENCY MEDICINE

## 2022-10-31 PROCEDURE — 250N000009 HC RX 250: Performed by: EMERGENCY MEDICINE

## 2022-10-31 PROCEDURE — 96374 THER/PROPH/DIAG INJ IV PUSH: CPT | Performed by: EMERGENCY MEDICINE

## 2022-10-31 PROCEDURE — 99284 EMERGENCY DEPT VISIT MOD MDM: CPT | Performed by: EMERGENCY MEDICINE

## 2022-10-31 PROCEDURE — 99207 PR SERVICE NOT STAFFED W/SUPERV PROV: CPT | Performed by: OPHTHALMOLOGY

## 2022-10-31 PROCEDURE — 36415 COLL VENOUS BLD VENIPUNCTURE: CPT | Performed by: EMERGENCY MEDICINE

## 2022-10-31 PROCEDURE — 82310 ASSAY OF CALCIUM: CPT | Performed by: EMERGENCY MEDICINE

## 2022-10-31 PROCEDURE — A9585 GADOBUTROL INJECTION: HCPCS | Performed by: EMERGENCY MEDICINE

## 2022-10-31 PROCEDURE — G1010 CDSM STANSON: HCPCS

## 2022-10-31 PROCEDURE — 85652 RBC SED RATE AUTOMATED: CPT | Performed by: EMERGENCY MEDICINE

## 2022-10-31 PROCEDURE — 82565 ASSAY OF CREATININE: CPT

## 2022-10-31 PROCEDURE — 99284 EMERGENCY DEPT VISIT MOD MDM: CPT | Mod: 25 | Performed by: EMERGENCY MEDICINE

## 2022-10-31 PROCEDURE — 83036 HEMOGLOBIN GLYCOSYLATED A1C: CPT | Performed by: EMERGENCY MEDICINE

## 2022-10-31 PROCEDURE — 84484 ASSAY OF TROPONIN QUANT: CPT | Performed by: EMERGENCY MEDICINE

## 2022-10-31 PROCEDURE — 85730 THROMBOPLASTIN TIME PARTIAL: CPT | Performed by: EMERGENCY MEDICINE

## 2022-10-31 PROCEDURE — 70496 CT ANGIOGRAPHY HEAD: CPT | Mod: MF

## 2022-10-31 PROCEDURE — 70553 MRI BRAIN STEM W/O & W/DYE: CPT | Mod: MF

## 2022-10-31 PROCEDURE — 93005 ELECTROCARDIOGRAM TRACING: CPT | Mod: RTG

## 2022-10-31 PROCEDURE — 86901 BLOOD TYPING SEROLOGIC RH(D): CPT | Performed by: EMERGENCY MEDICINE

## 2022-10-31 PROCEDURE — 86850 RBC ANTIBODY SCREEN: CPT | Performed by: EMERGENCY MEDICINE

## 2022-10-31 PROCEDURE — 70498 CT ANGIOGRAPHY NECK: CPT | Mod: MF

## 2022-10-31 PROCEDURE — 85610 PROTHROMBIN TIME: CPT | Performed by: EMERGENCY MEDICINE

## 2022-10-31 RX ORDER — IOPAMIDOL 755 MG/ML
100 INJECTION, SOLUTION INTRAVASCULAR ONCE
Status: COMPLETED | OUTPATIENT
Start: 2022-10-31 | End: 2022-10-31

## 2022-10-31 RX ORDER — OMEGA-3 FATTY ACIDS/FISH OIL 300-1000MG
200 CAPSULE ORAL EVERY 4 HOURS PRN
COMMUNITY

## 2022-10-31 RX ORDER — GADOBUTROL 604.72 MG/ML
7.5 INJECTION INTRAVENOUS ONCE
Status: COMPLETED | OUTPATIENT
Start: 2022-10-31 | End: 2022-10-31

## 2022-10-31 RX ADMIN — SODIUM CHLORIDE 80 ML: 9 INJECTION, SOLUTION INTRAVENOUS at 11:39

## 2022-10-31 RX ADMIN — GADOBUTROL 7.5 ML: 604.72 INJECTION INTRAVENOUS at 14:08

## 2022-10-31 RX ADMIN — METOCLOPRAMIDE HYDROCHLORIDE 10 MG: 5 INJECTION INTRAMUSCULAR; INTRAVENOUS at 12:40

## 2022-10-31 RX ADMIN — IOPAMIDOL 75 ML: 755 INJECTION, SOLUTION INTRAVENOUS at 11:38

## 2022-10-31 ASSESSMENT — VISUAL ACUITY
OS: 20/30
OD: 20/30
OD: 20/40
OS: 20/20

## 2022-10-31 ASSESSMENT — ENCOUNTER SYMPTOMS
NAUSEA: 0
TROUBLE SWALLOWING: 0
SPEECH DIFFICULTY: 0
FEVER: 0
SHORTNESS OF BREATH: 0
CHILLS: 0
WEAKNESS: 0
SLEEP DISTURBANCE: 1
NUMBNESS: 0
DECREASED CONCENTRATION: 0
VOMITING: 0
HEADACHES: 1

## 2022-10-31 ASSESSMENT — ACTIVITIES OF DAILY LIVING (ADL)
ADLS_ACUITY_SCORE: 35

## 2022-10-31 NOTE — ED NOTES
"Pt very upset and frustrated, stated \"that this is not an emergency clinic, if it was I would have been done!\"   Explained to the pt the ED process, ED test that needs to be done and the estimated time the results will be back.  MD has to make he has been checked out before he will be sent home. But pt continues to talk over writer, continued to rant, not listening to the explanation. Pt stated that he is \"now having a headache again because I am hungry\" Pt reminded that the ED MD has told him that he still needs to be  seen by Optha to check him.  Pt was given patient relations phone number so he can air out his frustration and \"poor service\" he is getting.  "

## 2022-10-31 NOTE — ED TRIAGE NOTES
Patient presents with headache and double vision for the past 3 days. Patient reports seeing two of everything when he has his left eye open. Patient reports having bilateral cataract surgery 14 years ago.Patient reports 9/10 throbbing headache.     Triage Assessment     Row Name 10/31/22 1017       Triage Assessment (Adult)    Airway WDL WDL       Respiratory WDL    Respiratory WDL WDL       Skin Circulation/Temperature WDL    Skin Circulation/Temperature WDL WDL       Cardiac WDL    Cardiac WDL WDL       Peripheral/Neurovascular WDL    Peripheral Neurovascular WDL WDL       Cognitive/Neuro/Behavioral WDL    Cognitive/Neuro/Behavioral WDL WDL

## 2022-10-31 NOTE — LETTER
October 31, 2022      To Whom It May Concern:      Alec Hanson was seen in our Emergency Department today, 10/31/22.  I expect his condition to improve over the next 1 days.  He may return to work when improved.

## 2022-10-31 NOTE — CONSULTS
OPHTHALMOLOGY CONSULT NOTE  10/31/22    Patient: Alec Hanson  Consulted by: ED  Reason for Consult: Double vision and headache    HISTORY OF PRESENTING ILLNESS:     Alec Hanson is a 72 year old male diet controlled type 2 diabetes, hypertension, and hyperlipidemia who presents for evaluation of headache and double vision. 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 He says the headache has been getting worse since then. He says the headache is around the left temple and in his scalp. He endorses a burning and throbbing sensation. He denies fevers, weight loss, hip/shoulder stiffness, and jaw claudication. He has also had horizontal binocular double vision for the past 2 days. He first noticed it on Saturday around noon when he looked left over his left shoulder to check for oncoming traffic during a driving test. He says his double vision clears up when he covers either the right eye or the left eye. The images are side by side and he is not able to pull them together into one image. There is no difference up close versus far away. He does notice that the double vision gets better when he looks to the right and gets worse when he looks to the left.    He says the headache is constant and doesn't go away with tylenol or ibuprofen. He says the medication he was given in the ED made it better for a little bit but then it came back.     He denies history of head injury, eye muscle surgery as a kid, or lazy eye. He denies family history of lazy eye too.     He denies face numbness or weakness. He endorses a history of chronic hearing loss for which he wears hearing aids. He denies history of neuropathy.    Review of systems were otherwise negative except for that which has been stated above.      OCULAR/MEDICAL/SURGICAL HISTORIES:     Past Ocular History:  Last eye exam: 3 months ago, was told everything looked fine  Prior eye surgery/laser: Cataract  surgery each eye 2008  Contact lens wear: no  Glasses: yes, reading  Eyedrops: Artificial tears as needed    Family History:  No family history of lazy eye    Social History:  Trying to get a job as a part time . He does not smoke and does not live around smokers.    Past Medical History:   Diagnosis Date     Controlled type 2 diabetes mellitus without complication, without long-term current use of insulin (H) 10/3/2017       Past Surgical History:   Procedure Laterality Date     CATARACT IOL, RT/LT Bilateral 2008       EXAMINATION:     Base Eye Exam     Visual Acuity (Snellen - Linear)       Right Left    Near cc 20/20 20/20    Correction: Glasses          Tonometry (Tonopen, 5:25 PM)       Right Left    Pressure 21 19          Pupils       Dark Light Shape React APD    Right 4.5 4 Round Brisk None    Left 3.5 3 Round Brisk None          Visual Fields (Counting fingers)       Left Right     Full Full          Extraocular Movement       Right Left     0 0 0   0  0   0 0 0    0 0 -3   0  -3   0 0 -3             Neuro/Psych     Oriented x3: Yes    Mood/Affect: Normal          Dilation     Both eyes: 1.0% Mydriacyl, 2.5% Emanuel Synephrine @ 5:29 PM            Additional Tests     Color       Right Left    Ishihara 11/11 11/11            Slit Lamp and Fundus Exam     External Exam       Right Left    External Normal Normal    Normal facial sensation in V1-V3 bilaterally, symmetric smile and palate elevation, symmetric tongue protrusion, symmetric shoulder shrug and face turn, hearing intact bilaterally, symmetric eye squeeze and eyebrow raise bilaterally          Slit Lamp Exam       Right Left    Lids/Lashes Normal Normal    Conjunctiva/Sclera nasal and temporal pinguecula nasal and temporal pinguecula (much larger nasally)    Cornea Clear Clear    Anterior Chamber Deep and quiet Deep and quiet    Iris Round and reactive Round and reactive    Lens Posterior chamber intraocular lens, phimosis Posterior chamber  "intraocular lens, phimosis    Anterior Vitreous Normal Normal          Fundus Exam       Right Left    Disc Normal Normal, no    C/D Ratio 0.3 0.4    Macula MAs, some exudates vs drusen Confluent soft drusen, Microaneurysms    Vessels Normal Normal    Periphery many Peripheral DBHs few peripheral DBHs                Labs/Studies/Imaging Performed:  CT head without contrast today                                                               \"IMPRESSION:     1. No evidence of acute intracranial hemorrhage, mass, or herniation.  2. Mild diffuse parenchymal volume loss and white matter changes  likely due to chronic microvascular ischemic disease.\"    CTA head and neck today:  \"IMPRESSIONCTA HEAD:  1. No high-grade stenosis or large vessel occlusion involving the  major intracranial arteries.  2. Mild focal stenosis of the distal cavernous segment of the left  internal carotid artery.     CTA NECK: No flow-limiting stenosis or occlusion of the bilateral  cervical carotid or vertebral arteries. Mild nonflow-limiting  atherosclerosis of the carotid bifurcations bilaterally.\"    MRI brain w/ and w/o contrast today:  \"IMPRESSION:    1. No evidence of acute infarct, mass, hemorrhage, or herniation.  2. Mild nonspecific white matter changes likely due to chronic  microvascular ischemic disease.\"        ASSESSMENT/PLAN:     Alec aHnson is a 72 year old male who presents with     # New isolated cranial nerve 6 palsy, left  - differential includes: increased intracranial pressure (hemorrhage, tumor, idiopathic), aneurysm, meningitis, GCA, ischemic pontine lesion, microvascular ischemic  - he does not have any intracranial bleeding, aneurysm, or compressive lesion seen on head imaging  - he does not have any disc edema on exam to correlate with increased intracranial pressure  - there is no pontine stroke noted on the MRI and he has not had any other symptoms of weakness or numbness  - he is not endorsing neck pain or stiffness, " does not have a fever, and has normal WBC so meningitis is less likely  - although he has left temporal headache, he denies fever, weight loss, hip/shoulder stiffness, and jaw claudication, he also is of  descent, which is not the typical demographic, and he has no vision loss or optic nerve changes  - the rest of his cranial nerve exam is normal with the exception of the left CN 6  - he has a history of latent TB treated with isoniazid in 2015  - he has microvascular risk factors of diabetes, hypertension, and hyperlipidemia and has imaging evidence of vascular calcifications and chronic microvascular ischemic changes  - overall his clinical presentation is consistent with an isolated microvascular ischemic 6th nerve palsy  - in this case the expected time course of resolution is improvement by 3 months and resolution or near resolution by 6 months    Plan:  - ok to patch the left eye if it helps him be more functional   - check ESR, HbA1c, treponema, lyme  - follow up with neuro-ophthlamology in 6 weeks (our office will call him to arrange this)  - encourage him to or assist him in arranging follow up with his PCP for management of his blood pressure, cholesterol, and blood sugar  - return precautions for worsening headache, weakness, numbness, or fever    # Non-proliferative diabetic retinopathy, both eyes  # Hx of AMD both eyes  - moderate stage right eye, mild stage left eye  - he should follow up with his retina specialist or general ophthalmologist at least annually    # Pseudophakia, both eyes  - clear and well centered    It is our pleasure to participate in this patient's care and treatment. Please contact us with any further questions or concerns.    Discussed with neuro-ophthalmology fellow Dr. Damion Villavicencio.      Genoveva Dalal MD  Ophthalmology Resident, PGY-3

## 2022-10-31 NOTE — ED PROVIDER NOTES
ED Provider Note  [facility] EMERGENCY DEPARTMENT ([facility])    10/31/22          History   No chief complaint on file.    The history is provided by the patient and medical records.     Alec Hanson is a 72 year old male with past medical history significant for type 2 diabetes and HTN who presents to the ED for evaluation of headache and blurred/double vision.  The patient states that 5 days ago he developed a gradual headache.  He states that initially the headache was more parietal and intermittent, but now it is more frontal/temporal and is constant.  He reports taking ibuprofen without relief.  He rates the headache as an 8-9/10.  He denies any history of headaches or migraines.  He reports it is difficult to sleep at night due to the headache.  At noon on Friday, 3 days ago, he was driving, scanning for pedestrians when he noticed something strange in the left mirror, but this quickly went away.  The next morning he woke up and his vision became blurry and he experienced double vision.  He states the symptoms come and go in the mornings and are constant in the afternoons.  He denies being anticoagulated.  He denies trauma or injury.  He denies any unilateral extremity weakness, numbness, or tingling.  He denies trouble swallowing, speaking, or concentrating.  He denies fevers, chills, nausea, or vomiting.  No chest pain or trouble breathing.    In 2015, the patient had a headache and a normal CT head.    CT HEAD W/O & W CONTRAST 6/19/2015 2:17 PM  IMPRESSION: Normal head CT.    Past Medical History  Past Medical History:   Diagnosis Date     Controlled type 2 diabetes mellitus without complication, without long-term current use of insulin (H) 10/3/2017     Past Surgical History:   Procedure Laterality Date     CATARACT IOL, RT/LT Bilateral 2008     aspirin (ASA) 81 MG tablet  blood glucose monitoring (MIKE MICROLET) lancets  Cholecalciferol (VITAMIN D  PO)  guaiFENesin-codeine (ROBITUSSIN AC) 100-10 MG/5ML solution  ibuprofen (ADVIL/MOTRIN) 200 MG capsule  polyethylene glycol (MIRALAX/GLYCOLAX) packet      No Known Allergies  Family History  Family History   Problem Relation Age of Onset     Glaucoma No family hx of      Macular Degeneration No family hx of      Social History   Social History     Tobacco Use     Smoking status: Never     Smokeless tobacco: Never   Substance Use Topics     Alcohol use: Yes     Comment: occasional glass of wine     Drug use: No      Past medical history, past surgical history, medications, allergies, family history, and social history were reviewed with the patient. No additional pertinent items.       Review of Systems   Constitutional: Negative for chills and fever.   HENT: Negative for trouble swallowing.    Eyes: Positive for visual disturbance.   Respiratory: Negative for shortness of breath.    Cardiovascular: Negative for chest pain.   Gastrointestinal: Negative for nausea and vomiting.   Neurological: Positive for headaches. Negative for speech difficulty, weakness and numbness.   Psychiatric/Behavioral: Positive for sleep disturbance. Negative for decreased concentration.   All other systems reviewed and are negative.    A complete review of systems was performed with pertinent positives and negatives noted in the HPI, and all other systems negative.    Physical Exam      Physical Exam  Vitals and nursing note reviewed.   Constitutional:       General: He is not in acute distress.     Appearance: Normal appearance. He is not diaphoretic.   HENT:      Head: Atraumatic.      Mouth/Throat:      Mouth: Oropharynx is clear and moist.   Eyes:      General: No scleral icterus.     Pupils: Pupils are equal, round, and reactive to light.      Comments: 6th cranial nerve palsy to the left   Cardiovascular:      Rate and Rhythm: Normal rate and regular rhythm.      Pulses: Intact distal pulses.      Heart sounds: Normal heart sounds.    Pulmonary:      Effort: No respiratory distress.      Breath sounds: Normal breath sounds.   Abdominal:      General: Bowel sounds are normal.      Palpations: Abdomen is soft.      Tenderness: There is no abdominal tenderness.   Musculoskeletal:         General: No tenderness or edema.   Skin:     General: Skin is warm.      Findings: No rash.   Neurological:      General: No focal deficit present.      Mental Status: He is alert and oriented to person, place, and time.      Cranial Nerves: Cranial nerve deficit present.   Psychiatric:         Mood and Affect: Mood normal.         Behavior: Behavior normal.           ED Course     10:39 AM  The patient was seen and examined by Edwina Jaimes MD in Consult room.     Procedures                 No results found for any visits on 10/31/22.  Medications - No data to display     Assessments & Plan (with Medical Decision Making)     72 year old male with past medical history significant for type 2 diabetes and HTN who presents to the ED for evaluation of headache and blurred/double vision, and CN 6 palsy on exam.  Initial blood pressure markedly elevated to 201/84 however the improved spontaneously to 183/75, remainder of vital signs afebrile and stable including normal pulse ox 100% on room air.  IV was established, labs drawn sent reviewed document epic essentially all unremarkable normal CBC electrolytes, normal troponin.  Patient was sent to CT for imaging of head neck which revealed no acute process, follow-up MRI brain also revealed no acute process.  Patient was given Reglan 10 mg IV piggyback with marked improvement in his headache from 9 out of 10 to 2 out of 10.  Ophthalmology consulted and evaluated patient at the bedside here in the emergency department, please refer to their note for further details.  Add on labs including hemoglobin A1c and inflammatory markers requested and pending at time of this dictation.  Patient be signed out to evening staff to  follow-up on lab work and final ophthalmology recommendations.    I have reviewed the nursing notes. I have reviewed the findings, diagnosis, plan and need for follow up with the patient.    New Prescriptions    No medications on file       Final diagnoses:   Nonintractable headache, unspecified chronicity pattern, unspecified headache type   Left abducens nerve palsy     I, Estrella Garza, am serving as a trained medical scribe to document services personally performed by Edwina Jaimes MD based on the provider's statements to me on October 31, 2022.  This document has been checked and approved by the attending provider.    I, Edwina Jaimes MD, was physically present and have reviewed and verified the accuracy of this note documented by Estrella Garza, medical scribe.    --  Edwina Jaimes MD   [facility] EMERGENCY DEPARTMENT  10/31/2022     Edwina Jaimes MD  10/31/22 1830

## 2022-10-31 NOTE — CONSULTS
"[facility]    Stroke Telephone Note    I was called by Edwina Jaimes on 10/31/22 regarding patient Alec Hanson. The patient is a 72 year old male with history of HTN, T2DM presented with headache since 5 days and binocular diplopia since 2-3 days. No other focal deficits on exam.     Imaging Findings   CT/CTA Pending    Impression  Possible acute ischemic stroke vs sentinel SAH    Recommendations   Recommend CT/CTA Head & Neck followed by MRI Brain wwo contrast    My recommendations are based on the information provided over the phone by Alec Hanson's in-person providers. They are not intended to replace the clinical judgment of his in-person providers. I was not requested to personally see or examine the patient at this time.    Pablo Schumacher MD       Department of Neurology       Securely message with the Vocera Web Console (learn more here)   To page me or covering stroke neurology team member, click here: AMCOM  Choose \"On Call\" tab at top, then search dropdown box for \"Neurology Adult\", select location, press Enter, then look for stroke/neuro ICU/telestroke.         "

## 2022-11-01 ENCOUNTER — TELEPHONE (OUTPATIENT)
Dept: OPHTHALMOLOGY | Facility: CLINIC | Age: 72
End: 2022-11-01

## 2022-11-01 ENCOUNTER — TELEPHONE (OUTPATIENT)
Dept: FAMILY MEDICINE | Facility: CLINIC | Age: 72
End: 2022-11-01

## 2022-11-01 DIAGNOSIS — G43.909 MIXED MIGRAINE AND MUSCLE CONTRACTION HEADACHE: Primary | ICD-10-CM

## 2022-11-01 DIAGNOSIS — G44.209 MIXED MIGRAINE AND MUSCLE CONTRACTION HEADACHE: Primary | ICD-10-CM

## 2022-11-01 LAB
ATRIAL RATE - MUSE: 93 BPM
DIASTOLIC BLOOD PRESSURE - MUSE: NORMAL MMHG
INTERPRETATION ECG - MUSE: NORMAL
P AXIS - MUSE: 64 DEGREES
PR INTERVAL - MUSE: 148 MS
QRS DURATION - MUSE: 84 MS
QT - MUSE: 368 MS
QTC - MUSE: 457 MS
R AXIS - MUSE: -36 DEGREES
SYSTOLIC BLOOD PRESSURE - MUSE: NORMAL MMHG
T AXIS - MUSE: 18 DEGREES
VENTRICULAR RATE- MUSE: 93 BPM

## 2022-11-01 RX ORDER — SUMATRIPTAN 25 MG/1
25 TABLET, FILM COATED ORAL
Qty: 10 TABLET | Refills: 0 | Status: SHIPPED | OUTPATIENT
Start: 2022-11-01

## 2022-11-01 NOTE — TELEPHONE ENCOUNTER
Have him try   Orders Placed This Encounter     SUMAtriptan (IMITREX) 25 MG tablet     Sig: Take 1 tablet (25 mg) by mouth at onset of headache for migraine or other (headaches) May repeat in 2 hours. Max 8 tablets/24 hours.     Dispense:  10 tablet     Refill:  0     If worse back to ER

## 2022-11-01 NOTE — TELEPHONE ENCOUNTER
Pt was having double vision is the same. One thing becomes two. Can't see far. Was seen yesterday by opt and suggested wearing a patch. Cant get eye doctor appointment until December 8 at 1:50. Will call with an opening if able. Pt is stating that he is having pain in the front of his head.     Pt was seen in the ED yesterday. Please see their note.    Has appointment on the 7th and would like to be seen sooner so he can get some stronger pain medication.     Motrin and tylenol are not working for his pain.     Ok to leave voicemail with message.     Provider Response to 2nd Level Triage Request    I have reviewed the RN documentation and routed message to PCP. My recommendation is:  Best addressed by PCP/specialist Dr. Torres

## 2022-11-01 NOTE — ED NOTES
came to draw blood but pt has left. Writer was not informed by lab that they still need to do blood draw

## 2022-11-01 NOTE — DISCHARGE INSTRUCTIONS
Discharge to home follow-up with both your primary MD for general medical needs as well as ophthalmology for continued monitoring and treatment.

## 2022-11-01 NOTE — TELEPHONE ENCOUNTER
Spoke to patient.  He scheduled already with primary eye provider and he thinks they are comfortable managing.  He will call us if he would like to schedule after seeing them.     Mary Hagan on 11/1/2022 at 5:30 PM

## 2022-11-01 NOTE — TELEPHONE ENCOUNTER
Pt was called and was given Dr. Torres recommendations. Pt is going to get the prescription and if that does not work is agreeable to going back to the ED.    Aracelis Pierce RN  [facility]

## 2022-11-07 ENCOUNTER — APPOINTMENT (OUTPATIENT)
Dept: LAB | Facility: CLINIC | Age: 72
End: 2022-11-07
Payer: MEDICARE

## 2022-11-07 ENCOUNTER — OFFICE VISIT (OUTPATIENT)
Dept: FAMILY MEDICINE | Facility: CLINIC | Age: 72
End: 2022-11-07
Payer: MEDICARE

## 2022-11-07 VITALS
RESPIRATION RATE: 16 BRPM | BODY MASS INDEX: 23.7 KG/M2 | TEMPERATURE: 97.7 F | SYSTOLIC BLOOD PRESSURE: 150 MMHG | HEIGHT: 67 IN | DIASTOLIC BLOOD PRESSURE: 76 MMHG | OXYGEN SATURATION: 99 % | WEIGHT: 151 LBS

## 2022-11-07 DIAGNOSIS — Z09 HOSPITAL DISCHARGE FOLLOW-UP: Primary | ICD-10-CM

## 2022-11-07 DIAGNOSIS — H53.2 DOUBLE VISION: ICD-10-CM

## 2022-11-07 DIAGNOSIS — Z13.220 SCREENING FOR HYPERLIPIDEMIA: ICD-10-CM

## 2022-11-07 DIAGNOSIS — E11.9 TYPE 2 DIABETES MELLITUS WITHOUT COMPLICATION, WITHOUT LONG-TERM CURRENT USE OF INSULIN (H): ICD-10-CM

## 2022-11-07 DIAGNOSIS — R03.0 ELEVATED BP WITHOUT DIAGNOSIS OF HYPERTENSION: ICD-10-CM

## 2022-11-07 DIAGNOSIS — R51.9 TEMPORAL PAIN: ICD-10-CM

## 2022-11-07 LAB
CHOLEST SERPL-MCNC: 235 MG/DL
CREAT UR-MCNC: 78 MG/DL
ERYTHROCYTE [SEDIMENTATION RATE] IN BLOOD BY WESTERGREN METHOD: 12 MM/HR (ref 0–20)
FASTING STATUS PATIENT QL REPORTED: NO
HDLC SERPL-MCNC: 52 MG/DL
LDLC SERPL CALC-MCNC: 139 MG/DL
MICROALBUMIN UR-MCNC: 13 MG/L
MICROALBUMIN/CREAT UR: 16.67 MG/G CR (ref 0–17)
NONHDLC SERPL-MCNC: 183 MG/DL
TRIGL SERPL-MCNC: 222 MG/DL

## 2022-11-07 PROCEDURE — 82043 UR ALBUMIN QUANTITATIVE: CPT | Performed by: FAMILY MEDICINE

## 2022-11-07 PROCEDURE — 99214 OFFICE O/P EST MOD 30 MIN: CPT | Performed by: FAMILY MEDICINE

## 2022-11-07 PROCEDURE — 36415 COLL VENOUS BLD VENIPUNCTURE: CPT | Performed by: FAMILY MEDICINE

## 2022-11-07 PROCEDURE — 80061 LIPID PANEL: CPT | Performed by: FAMILY MEDICINE

## 2022-11-07 PROCEDURE — 85652 RBC SED RATE AUTOMATED: CPT | Performed by: FAMILY MEDICINE

## 2022-11-07 RX ORDER — LISINOPRIL 20 MG/1
20 TABLET ORAL DAILY
Qty: 90 TABLET | Refills: 1 | Status: SHIPPED | OUTPATIENT
Start: 2022-11-07

## 2022-11-07 ASSESSMENT — PAIN SCALES - GENERAL: PAINLEVEL: NO PAIN (0)

## 2022-11-07 NOTE — PROGRESS NOTES
Assessment & Plan     Hospital discharge follow-up  Seen 10/31 , had full evaluation for headache/ sudden onset vision changes  negative lCT/MRI   normal labs  Seeing opth for double vision' headache better but not gone    Temporal pain  Ha d.nl ESR in ED  - ESR: Erythrocyte sedimentation rate  - ESR: Erythrocyte sedimentation rate    Elevated BP start lisinopril     Type 2 diabetes mellitus without complication, without long-term current use of insulin (H)  Well controlled   - Lipid panel reflex to direct LDL Non-fasting  - Albumin Random Urine Quantitative with Creat Ratio  - lisinopril (ZESTRIL) 20 MG tablet  Dispense: 90 tablet; Refill: 1  - Lipid panel reflex to direct LDL Non-fasting  - Albumin Random Urine Quantitative with Creat Ratio    Double vision  Follow up with consultant as planned.     Screening for hyperlipidemia    - Lipid panel reflex to direct LDL Non-fasting  - Lipid panel reflex to direct LDL Non-fasting      Review of external notes as documented elsewhere in note  Review of the result(s) of each unique test - labs/ CT/MRI  23 minutes spent on the date of the encounter doing review of outside records and review of test results      MED REC REQUIRED  Post Medication Reconciliation Status:       Return for with me, in person.    Riaz Torres MD  [facility] DOROTA Michael is a 72 year old, presenting for the following health issues:  Lipids and Diabetes      HPI         Hospital Follow-up Visit:    Hospital/Nursing Home/IP Rehab Facility: [facility]  Date of Admission: 10/31  Date of Discharge: same  Reason(s) for Admission:   Encounter Diagnoses   Name Primary?     Hospital discharge follow-up Yes     Temporal pain      Type 2 diabetes mellitus without complication, without long-term current use of insulin (H)      Double vision      Screening for hyperlipidemia      Elevated BP without diagnosis of  "hypertension         Was your hospitalization related to COVID-19? No   Problems taking medications regularly:  None  Medication changes since discharge: None  Problems adhering to non-medication therapy:  None    Summary of hospitalization:  [facility] discharge summary reviewed  Diagnostic Tests/Treatments reviewed.  Follow up needed: Opth  Other Healthcare Providers Involved in Patient s Care:         Specialist appointment - tomorrow  Update since discharge: improved.   Plan of care communicated with patient         Review of Systems   Constitutional, HEENT, cardiovascular, pulmonary, gi and gu systems are negative, except as otherwise noted.      Objective    BP (!) 150/76   Temp 97.7  F (36.5  C) (Temporal)   Resp 16   Ht 1.702 m (5' 7\")   Wt 68.5 kg (151 lb)   SpO2 99%   BMI 23.65 kg/m    Body mass index is 23.65 kg/m .  Physical Exam   GENERAL: healthy, alert and no distress  HENT: ear canals and TM's normal, nose and mouth without ulcers or lesions  NECK: no adenopathy, no asymmetry, masses, or scars and thyroid normal to palpation  RESP: lungs clear to auscultation - no rales, rhonchi or wheezes  CV: regular rate and rhythm, normal S1 S2, no S3 or S4, no murmur, click or rub, no peripheral edema and peripheral pulses strong  ABDOMEN: soft, nontender, no hepatosplenomegaly, no masses and bowel sounds normal  SKIN: no suspicious lesions or rashes  Is moderately tender left temple/ no redeness or swelling  NEURO: Normal strength and tone, mentation intact and speech normal  PSYCH: mentation appears normal, affect normal/bright    Admission on 10/31/2022, Discharged on 10/31/2022   Component Date Value Ref Range Status     INR 10/31/2022 1.04  0.85 - 1.15 Final    Some International Normalized Ratio (INR) results performed at the [facility] for patients 6 months and older reported between 07/11/2021 and 5/17/2022 were evaluated against an outdated reference interval of " 0.86-1.14 rather than the intended reference interval of 0.85-1.15. The INR value itself was accurate, but may not have been flagged correctly due to the outdated reference interval.     aPTT 10/31/2022 26  22 - 38 Seconds Final     Sodium 10/31/2022 137  133 - 144 mmol/L Final     Potassium 10/31/2022 4.0  3.4 - 5.3 mmol/L Final     Chloride 10/31/2022 103  94 - 109 mmol/L Final     Carbon Dioxide (CO2) 10/31/2022 27  20 - 32 mmol/L Final     Anion Gap 10/31/2022 7  3 - 14 mmol/L Final     Urea Nitrogen 10/31/2022 9  7 - 30 mg/dL Final     Creatinine 10/31/2022 1.09  0.66 - 1.25 mg/dL Final     Calcium 10/31/2022 9.1  8.5 - 10.1 mg/dL Final     Glucose 10/31/2022 176 (H)  70 - 99 mg/dL Final     GFR Estimate 10/31/2022 71  >60 mL/min/1.73m2 Final    Effective December 21, 2021 eGFRcr in adults is calculated using the 2021 CKD-EPI creatinine equation which includes age and gender (Sherrill et al., NEJM, DOI: 10.1056/BUNMxx2817367)     Troponin I High Sensitivity 10/31/2022 6  <79 ng/L Final    Comment: This Troponin-I result was obtained using a Siemens Dimension Vista High Sensitivity Troponin-I assay (TNIH). Effective 11/23/21, nine labs/sites in the [facility] switched from a Siemens [place] Contemporary Troponin I assay (CTNI) to a Siemens [place] High-Sensitivity Troponin I assay (TNIH).  Either a High Sensitivity Troponin I baseline (0 hours) value = 120 ng/mL, or an increase in High Sensitivity Troponin I = 20 ng/mL at 2 hours compared to 0 hours (2-0 hours), suggests myocardial injury, and urgent clinical attention is required.    If the 2-0 hours increase is<20 ng/mL, a High Sensitivity Troponin I result above gender-specific reference ranges warrants further evaluation.  Recommendations for further evaluation include correlation with clinical decision-making tool (e.g., HEART), a 3rd High Sensitivity Troponin I test 2 hours after the 2nd (a 20% change from baseline would represent concern), admission for  observation, close PCC/cardiology follow-up, or urgent outpatient                            provocative testing.       Ventricular Rate 10/31/2022 93  BPM Final     Atrial Rate 10/31/2022 93  BPM Final     MO Interval 10/31/2022 148  ms Final     QRS Duration 10/31/2022 84  ms Final     QT 10/31/2022 368  ms Final     QTc 10/31/2022 457  ms Final     P Axis 10/31/2022 64  degrees Final     R AXIS 10/31/2022 -36  degrees Final     T Axis 10/31/2022 18  degrees Final     Interpretation ECG 10/31/2022    Final                    Value:Sinus rhythm with Premature atrial complexes  Left axis deviation  Minimal voltage criteria for LVH, may be normal variant  Anterolateral infarct , age undetermined  Abnormal ECG  Unconfirmed report - interpretation of this ECG is computer generated - see medical record for final interpretation      Confirmed by - EMERGENCY ROOM, PHYSICIAN ([id number]),  GUILHERME ROBISON ([id number]) on 11/1/2022 12:27:01 PM       Color Urine 10/31/2022 Light Yellow  Colorless, Straw, Light Yellow, Yellow Final     Appearance Urine 10/31/2022 Clear  Clear Final     Glucose Urine 10/31/2022 200 (A)  Negative mg/dL Final     Bilirubin Urine 10/31/2022 Negative  Negative Final     Ketones Urine 10/31/2022 Negative  Negative mg/dL Final     Specific Gravity Urine 10/31/2022 1.009  1.003 - 1.035 Final     Blood Urine 10/31/2022 Negative  Negative Final     pH Urine 10/31/2022 5.0  5.0 - 7.0 Final     Protein Albumin Urine 10/31/2022 Negative  Negative mg/dL Final     Urobilinogen Urine 10/31/2022 Normal  Normal, 2.0 mg/dL Final     Nitrite Urine 10/31/2022 Negative  Negative Final     Leukocyte Esterase Urine 10/31/2022 Negative  Negative Final     Mucus Urine 10/31/2022 Present (A)  None Seen /LPF Final     RBC Urine 10/31/2022 1  <=2 /HPF Final     WBC Urine 10/31/2022 <1  <=5 /HPF Final     WBC Count 10/31/2022 4.6  4.0 - 11.0 10e3/uL Final     RBC Count 10/31/2022 4.38 (L)  4.40 - 5.90 10e6/uL Final      Hemoglobin 10/31/2022 13.9  13.3 - 17.7 g/dL Final     Hematocrit 10/31/2022 40.2  40.0 - 53.0 % Final     MCV 10/31/2022 92  78 - 100 fL Final     MCH 10/31/2022 31.7  26.5 - 33.0 pg Final     MCHC 10/31/2022 34.6  31.5 - 36.5 g/dL Final     RDW 10/31/2022 12.8  10.0 - 15.0 % Final     Platelet Count 10/31/2022 225  150 - 450 10e3/uL Final     % Neutrophils 10/31/2022 63  % Final     % Lymphocytes 10/31/2022 28  % Final     % Monocytes 10/31/2022 8  % Final     % Eosinophils 10/31/2022 1  % Final     % Basophils 10/31/2022 0  % Final     % Immature Granulocytes 10/31/2022 0  % Final     NRBCs per 100 WBC 10/31/2022 0  <1 /100 Final     Absolute Neutrophils 10/31/2022 2.9  1.6 - 8.3 10e3/uL Final     Absolute Lymphocytes 10/31/2022 1.3  0.8 - 5.3 10e3/uL Final     Absolute Monocytes 10/31/2022 0.4  0.0 - 1.3 10e3/uL Final     Absolute Eosinophils 10/31/2022 0.0  0.0 - 0.7 10e3/uL Final     Absolute Basophils 10/31/2022 0.0  0.0 - 0.2 10e3/uL Final     Absolute Immature Granulocytes 10/31/2022 0.0  <=0.4 10e3/uL Final     Absolute NRBCs 10/31/2022 0.0  10e3/uL Final     ABO/RH(D) 10/31/2022 AB POS   Final     Antibody Screen 10/31/2022 Negative  Negative Final     SPECIMEN EXPIRATION DATE 10/31/2022 [id number]   Final     Creatinine POCT 10/31/2022 1.1  0.7 - 1.3 mg/dL Final     GFR, ESTIMATED POCT 10/31/2022 >60  >60 mL/min/1.73m2 Final     Erythrocyte Sedimentation Rate 10/31/2022 7  0 - 20 mm/hr Final     CRP Inflammation 10/31/2022 <2.9  0.0 - 8.0 mg/L Final     Hemoglobin A1C 10/31/2022 7.1 (H)  0.0 - 5.6 % Final    Normal <5.7%   Prediabetes 5.7-6.4%    Diabetes 6.5% or higher     Note: Adopted from ADA consensus guidelines.

## 2022-11-10 ENCOUNTER — TELEPHONE (OUTPATIENT)
Dept: FAMILY MEDICINE | Facility: CLINIC | Age: 72
End: 2022-11-10

## 2022-11-10 DIAGNOSIS — R51.9 RIGHT SIDED TEMPORAL HEADACHE: Primary | ICD-10-CM

## 2022-11-10 RX ORDER — CYCLOBENZAPRINE HCL 5 MG
5 TABLET ORAL 3 TIMES DAILY PRN
Qty: 20 TABLET | Refills: 1 | Status: SHIPPED | OUTPATIENT
Start: 2022-11-10

## 2022-11-10 NOTE — TELEPHONE ENCOUNTER
----- Message from Faye Garcia RN sent at 11/10/2022 10:03 AM CST -----  Regarding: RE: follow up from 11/7 appt  Ok, this kellie is not accepting this, says eye doctor said you will manage eye pain. He is wanting pain meds for his eye pain, taking ibuprofen and not helping. HA on left side still too. Wants you to call him or talk to his eye doctor about who will take care of his pain.... I can come talk to you to explain too but you weren't at desk.   [phone] is eye doc (for my records)  ----- Message -----  From: Riaz Torres MD  Sent: 11/10/2022   8:49 AM CST  To: Faye Garcia RN  Subject: RE: follow up from 11/7 appt                     Yes I do not manage this nor do I know how to manage this  ----- Message -----  From: Faye Garcia RN  Sent: 11/10/2022   8:05 AM CST  To: Riaz Torres MD  Subject: RE: follow up from 11/7 appt                     OK I will tell him to resent. To be clear though, you would not be managing any patching or eye pain, correct? That would be optho?  ----- Message -----  From: Riaz Torres MD  Sent: 11/10/2022   7:50 AM CST  To: Faye Garcia RN  Subject: RE: follow up from 11/7 appt                     I can seem to find it  ----- Message -----  From: Faye Garcia RN  Sent: 11/9/2022   4:16 PM CST  To: Riaz Torres MD  Subject: follow up from 11/7 appt                         Pt states his retina specialist (Dr. Nguyen) from [facility] was going to send recommendations (via email per pt) to you about patching/managing his eye pain. He is wondering 1) if you got this 2) if you have recommendations about his eye pain...  I did ask him to reach back out to that clinic to see who/how they sent their notes to.    Faye COSBY RN

## 2022-11-10 NOTE — TELEPHONE ENCOUNTER
Riaz Torres MD Brown, Hannah, RN  He has temporal / forehead pain    Ok to try flexeril but also needs to follow up with Opth               Pt notified rx was sent, reiterated any eye pain needs to be seen by eye doc.     Faye COSBY RN

## 2022-12-26 ENCOUNTER — HEALTH MAINTENANCE LETTER (OUTPATIENT)
Age: 72
End: 2022-12-26

## 2023-03-23 ENCOUNTER — OFFICE VISIT (OUTPATIENT)
Dept: FAMILY MEDICINE | Facility: CLINIC | Age: 73
End: 2023-03-23
Payer: MEDICARE

## 2023-03-23 VITALS
HEIGHT: 67 IN | HEART RATE: 73 BPM | SYSTOLIC BLOOD PRESSURE: 138 MMHG | BODY MASS INDEX: 23.46 KG/M2 | OXYGEN SATURATION: 100 % | WEIGHT: 149.5 LBS | DIASTOLIC BLOOD PRESSURE: 76 MMHG | RESPIRATION RATE: 15 BRPM | TEMPERATURE: 96.9 F

## 2023-03-23 DIAGNOSIS — E11.9 TYPE 2 DIABETES MELLITUS WITHOUT COMPLICATION, WITHOUT LONG-TERM CURRENT USE OF INSULIN (H): ICD-10-CM

## 2023-03-23 DIAGNOSIS — Z13.6 CARDIOVASCULAR SCREENING; LDL GOAL LESS THAN 160: ICD-10-CM

## 2023-03-23 DIAGNOSIS — Z00.00 MEDICARE ANNUAL WELLNESS VISIT, SUBSEQUENT: Primary | ICD-10-CM

## 2023-03-23 DIAGNOSIS — I10 HYPERTENSION GOAL BP (BLOOD PRESSURE) < 140/90: ICD-10-CM

## 2023-03-23 LAB
ALBUMIN SERPL BCG-MCNC: 4.6 G/DL (ref 3.5–5.2)
ALP SERPL-CCNC: 59 U/L (ref 40–129)
ALT SERPL W P-5'-P-CCNC: 14 U/L (ref 10–50)
ANION GAP SERPL CALCULATED.3IONS-SCNC: 13 MMOL/L (ref 7–15)
AST SERPL W P-5'-P-CCNC: 21 U/L (ref 10–50)
BILIRUB SERPL-MCNC: 0.5 MG/DL
BUN SERPL-MCNC: 11.7 MG/DL (ref 8–23)
CALCIUM SERPL-MCNC: 10 MG/DL (ref 8.8–10.2)
CHLORIDE SERPL-SCNC: 97 MMOL/L (ref 98–107)
CHOLEST SERPL-MCNC: 252 MG/DL
CREAT SERPL-MCNC: 1.09 MG/DL (ref 0.67–1.17)
CREAT UR-MCNC: 97.1 MG/DL
DEPRECATED HCO3 PLAS-SCNC: 25 MMOL/L (ref 22–29)
ERYTHROCYTE [DISTWIDTH] IN BLOOD BY AUTOMATED COUNT: 12.7 % (ref 10–15)
GFR SERPL CREATININE-BSD FRML MDRD: 71 ML/MIN/1.73M2
GLUCOSE SERPL-MCNC: 148 MG/DL (ref 70–99)
HBA1C MFR BLD: 6.9 % (ref 0–5.6)
HCT VFR BLD AUTO: 39.5 % (ref 40–53)
HDLC SERPL-MCNC: 51 MG/DL
HGB BLD-MCNC: 13.8 G/DL (ref 13.3–17.7)
LDLC SERPL CALC-MCNC: 174 MG/DL
MCH RBC QN AUTO: 31.7 PG (ref 26.5–33)
MCHC RBC AUTO-ENTMCNC: 34.9 G/DL (ref 31.5–36.5)
MCV RBC AUTO: 91 FL (ref 78–100)
MICROALBUMIN UR-MCNC: <12 MG/L
MICROALBUMIN/CREAT UR: NORMAL MG/G{CREAT}
NONHDLC SERPL-MCNC: 201 MG/DL
PLATELET # BLD AUTO: 230 10E3/UL (ref 150–450)
POTASSIUM SERPL-SCNC: 4.7 MMOL/L (ref 3.4–5.3)
PROT SERPL-MCNC: 7.7 G/DL (ref 6.4–8.3)
RBC # BLD AUTO: 4.36 10E6/UL (ref 4.4–5.9)
SODIUM SERPL-SCNC: 135 MMOL/L (ref 136–145)
TRIGL SERPL-MCNC: 133 MG/DL
WBC # BLD AUTO: 6 10E3/UL (ref 4–11)

## 2023-03-23 PROCEDURE — 83036 HEMOGLOBIN GLYCOSYLATED A1C: CPT | Performed by: FAMILY MEDICINE

## 2023-03-23 PROCEDURE — 82570 ASSAY OF URINE CREATININE: CPT | Performed by: FAMILY MEDICINE

## 2023-03-23 PROCEDURE — 85027 COMPLETE CBC AUTOMATED: CPT | Performed by: FAMILY MEDICINE

## 2023-03-23 PROCEDURE — 80053 COMPREHEN METABOLIC PANEL: CPT | Performed by: FAMILY MEDICINE

## 2023-03-23 PROCEDURE — 80061 LIPID PANEL: CPT | Performed by: FAMILY MEDICINE

## 2023-03-23 PROCEDURE — G0439 PPPS, SUBSEQ VISIT: HCPCS | Performed by: FAMILY MEDICINE

## 2023-03-23 PROCEDURE — 36415 COLL VENOUS BLD VENIPUNCTURE: CPT | Performed by: FAMILY MEDICINE

## 2023-03-23 PROCEDURE — 99214 OFFICE O/P EST MOD 30 MIN: CPT | Mod: 25 | Performed by: FAMILY MEDICINE

## 2023-03-23 PROCEDURE — 82043 UR ALBUMIN QUANTITATIVE: CPT | Performed by: FAMILY MEDICINE

## 2023-03-23 PROCEDURE — 99207 PR FOOT EXAM NO CHARGE: CPT | Performed by: FAMILY MEDICINE

## 2023-03-23 ASSESSMENT — ENCOUNTER SYMPTOMS
HEMATURIA: 0
PARESTHESIAS: 0
DIZZINESS: 0
CONSTIPATION: 1
MYALGIAS: 0
ABDOMINAL PAIN: 0
NAUSEA: 0
ARTHRALGIAS: 0
HEARTBURN: 0
SHORTNESS OF BREATH: 0
CHILLS: 0
NERVOUS/ANXIOUS: 0
WEAKNESS: 0
COUGH: 1
PALPITATIONS: 0
HEMATOCHEZIA: 0
JOINT SWELLING: 0
FREQUENCY: 0
DIARRHEA: 0
FEVER: 0
HEADACHES: 1
SORE THROAT: 1
EYE PAIN: 0
DYSURIA: 0

## 2023-03-23 ASSESSMENT — ACTIVITIES OF DAILY LIVING (ADL): CURRENT_FUNCTION: NO ASSISTANCE NEEDED

## 2023-03-23 ASSESSMENT — PAIN SCALES - GENERAL: PAINLEVEL: NO PAIN (0)

## 2023-03-23 NOTE — PROGRESS NOTES
"SUBJECTIVE:   Alec is a 72 year old who presents for Preventive Visit.  Additional Questions 3/23/2023   Roomed by Marilyn Snider   Accompanied by N/A   Patient has been advised of split billing requirements and indicates understanding: Yes  Are you in the first 12 months of your Medicare coverage?  No    Healthy Habits:     In general, how would you rate your overall health?  Good    Frequency of exercise:  2-3 days/week    Duration of exercise:  30-45 minutes    Do you usually eat at least 4 servings of fruit and vegetables a day, include whole grains    & fiber and avoid regularly eating high fat or \"junk\" foods?  Yes    Taking medications regularly:  Yes    Medication side effects:  Muscle aches    Ability to successfully perform activities of daily living:  No assistance needed    Home Safety:  No safety concerns identified    Hearing Impairment:  Difficulty following a conversation in a noisy restaurant or crowded room    In the past 6 months, have you been bothered by leaking of urine?  No    In general, how would you rate your overall mental or emotional health?  Good      PHQ-2 Total Score: 0      Have you ever done Advance Care Planning? (For example, a Health Directive, POLST, or a discussion with a medical provider or your loved ones about your wishes): No, advance care planning information given to patient to review.  Patient plans to discuss their wishes with loved ones or provider.         Fall risk  Fallen 2 or more times in the past year?: No  Any fall with injury in the past year?: No    Cognitive Screening   1) Repeat 3 items (Leader, Season, Table)    2) Clock draw:   3) 3 item recall: Recalls 2 objects   Results: NORMAL clock, 1-2 items recalled: COGNITIVE IMPAIRMENT LESS LIKELY    Mini-CogTM Copyright [employer]. Licensed by the author for use in [facility]; reprinted with permission (thong@.[facility]). All rights reserved.    Patient refuse to do the mini cognitive screening.    Do you " have sleep apnea, excessive snoring or daytime drowsiness?: no    Reviewed and updated as needed this visit by clinical staff   Tobacco  Allergies  Meds              Reviewed and updated as needed this visit by Provider                 Social History     Tobacco Use     Smoking status: Never     Smokeless tobacco: Never   Substance Use Topics     Alcohol use: Yes     Comment: occasional glass of wine         Alcohol Use 3/23/2023   Prescreen: >3 drinks/day or >7 drinks/week? Not Applicable   No flowsheet data found.  Do you have a current opioid prescription?   Do you use any other controlled substances or medications that are not prescribed by a provider? None          Diabetes Follow-up      How often are you checking your blood sugar? Not at all    What concerns do you have today about your diabetes? None     Do you have any of these symptoms? (Select all that apply)  No numbness or tingling in feet.  No redness, sores or blisters on feet.  No complaints of excessive thirst.  No reports of blurry vision.  No significant changes to weight.        Hyperlipidemia Follow-Up      Are you regularly taking any medication or supplement to lower your cholesterol?   No    Are you having muscle aches or other side effects that you think could be caused by your cholesterol lowering medication?  No    Hypertension Follow-up      Do you check your blood pressure regularly outside of the clinic? No     Are you following a low salt diet? No    Are your blood pressures ever more than 140 on the top number (systolic) OR more   than 90 on the bottom number (diastolic), for example 140/90? Yes    BP Readings from Last 2 Encounters:   03/23/23 138/76   11/07/22 (!) 150/76     Hemoglobin A1C (%)   Date Value   03/23/2023 6.9 (H)   10/31/2022 7.1 (H)   05/13/2021 7.0 (H)   07/31/2020 6.4 (H)     LDL Cholesterol Calculated (mg/dL)   Date Value   11/07/2022 139 (H)   05/13/2021 172 (H)   07/31/2020 133 (H)         Current providers  sharing in care for this patient include:   Patient Care Team:  Riaz Torres MD as PCP - General (Family Practice)  Yael Hanks MD as MD (Ophthalmology)  Riaz Torres MD as MD (Family Practice)  Aravind Gloria MD as MD (Neurology)  Riaz Torres MD as Assigned PCP    The following health maintenance items are reviewed in Epic and correct as of today:  Health Maintenance   Topic Date Due     ANNUAL REVIEW OF HM ORDERS  Never done     ZOSTER IMMUNIZATION (1 of 2) Never done     AORTIC ANEURYSM SCREENING (SYSTEM ASSIGNED)  Never done     MEDICARE ANNUAL WELLNESS VISIT  07/31/2021     COVID-19 Vaccine (4 - Booster for Moderna series) 02/15/2022     DIABETIC FOOT EXAM  05/13/2022     INFLUENZA VACCINE (1) 09/01/2022     A1C  09/23/2023     BMP  10/31/2023     EYE EXAM  10/31/2023     LIPID  11/07/2023     MICROALBUMIN  11/07/2023     FALL RISK ASSESSMENT  03/23/2024     DTAP/TDAP/TD IMMUNIZATION (2 - Td or Tdap) 07/31/2024     COLORECTAL CANCER SCREENING  01/01/2026     ADVANCE CARE PLANNING  03/23/2028     HEPATITIS C SCREENING  Completed     PHQ-2 (once per calendar year)  Completed     Pneumococcal Vaccine: 65+ Years  Completed     IPV IMMUNIZATION  Aged Out     MENINGITIS IMMUNIZATION  Aged Out     Lab work is in process          Review of Systems   Constitutional: Negative for chills and fever.   HENT: Positive for hearing loss and sore throat. Negative for congestion and ear pain.    Eyes: Negative for pain and visual disturbance.   Respiratory: Positive for cough. Negative for shortness of breath.    Cardiovascular: Negative for chest pain, palpitations and peripheral edema.   Gastrointestinal: Positive for constipation. Negative for abdominal pain, diarrhea, heartburn, hematochezia and nausea.   Genitourinary: Positive for impotence. Negative for dysuria, frequency, genital sores, hematuria, penile discharge and urgency.   Musculoskeletal: Negative for  "arthralgias, joint swelling and myalgias.   Skin: Negative for rash.   Neurological: Positive for headaches. Negative for dizziness, weakness and paresthesias.   Psychiatric/Behavioral: Negative for mood changes. The patient is not nervous/anxious.      Constitutional, HEENT, cardiovascular, pulmonary, gi and gu systems are negative, except as otherwise noted.    OBJECTIVE:   /76 (BP Location: Left arm, Patient Position: Sitting, Cuff Size: Adult Regular)   Pulse 73   Temp 96.9  F (36.1  C) (Temporal)   Resp 15   Ht 1.704 m (5' 7.09\")   Wt 67.8 kg (149 lb 8 oz)   SpO2 100%   BMI 23.35 kg/m   Estimated body mass index is 23.35 kg/m  as calculated from the following:    Height as of this encounter: 1.704 m (5' 7.09\").    Weight as of this encounter: 67.8 kg (149 lb 8 oz).  Physical Exam  GENERAL: healthy, alert and no distress  EYES: Eyes grossly normal to inspection, PERRL and conjunctivae and sclerae normal  HENT: ear canals and TM's normal, nose and mouth without ulcers or lesions  NECK: no adenopathy, no asymmetry, masses, or scars and thyroid normal to palpation  RESP: lungs clear to auscultation - no rales, rhonchi or wheezes  CV: regular rate and rhythm, normal S1 S2, no S3 or S4, no murmur, click or rub, no peripheral edema and peripheral pulses strong  ABDOMEN: soft, nontender, no hepatosplenomegaly, no masses and bowel sounds normal  MS: no gross musculoskeletal defects noted, no edema  SKIN: no suspicious lesions or rashes  NEURO: Normal strength and tone, mentation intact and speech normal  PSYCH: mentation appears normal, affect normal/bright  LYMPH: no cervical, supraclavicular, axillary, or inguinal adenopathy  Diabetic foot exam: normal DP and PT pulses, no trophic changes or ulcerative lesions and normal sensory exam    Diagnostic Test Results:  Labs reviewed in Epic    ASSESSMENT / PLAN:   (Z00.00) Medicare annual wellness visit, subsequent  (primary encounter diagnosis)  Comment: doing " well  Plan: PRIMARY CARE FOLLOW-UP SCHEDULING            (E11.9) Type 2 diabetes mellitus without complication, without long-term current use of insulin (H)  Comment: Well controlled   Plan: Hemoglobin A1c, Comprehensive metabolic panel         (BMP + Alb, Alk Phos, ALT, AST, Total. Bili,         TP), Lipid panel reflex to direct LDL Fasting            (I10) Hypertension goal BP (blood pressure) < 140/90  Comment: Well controlled on medication   Plan: Albumin Random Urine Quantitative with Creat         Ratio, CBC with platelets, Comprehensive         metabolic panel (BMP + Alb, Alk Phos, ALT, AST,        Total. Bili, TP), Lipid panel reflex to direct         LDL Fasting            (Z13.6) CARDIOVASCULAR SCREENING; LDL GOAL LESS THAN 160  Comment: recheck  Plan: Lipid panel reflex to direct LDL Fasting                    COUNSELING:  Reviewed preventive health counseling, as reflected in patient instructions       Regular exercise       Healthy diet/nutrition       Vision screening       Hearing screening       Dental care       Bladder control       Fall risk prevention        He reports that he has never smoked. He has never used smokeless tobacco.      Appropriate preventive services were discussed with this patient, including applicable screening as appropriate for cardiovascular disease, diabetes, osteopenia/osteoporosis, and glaucoma.  As appropriate for age/gender, discussed screening for colorectal cancer, prostate cancer, breast cancer, and cervical cancer. Checklist reviewing preventive services available has been given to the patient.    Reviewed patients plan of care and provided an AVS. The Intermediate Care Plan ( asthma action plan, low back pain action plan, and migraine action plan) for Alec meets the Care Plan requirement. This Care Plan has been established and reviewed with the Patient.      Riaz Torres MD  [facility]    Identified Health Risks:

## 2023-03-23 NOTE — PATIENT INSTRUCTIONS
Patient Education   Personalized Prevention Plan  You are due for the preventive services outlined below.  Your care team is available to assist you in scheduling these services.  If you have already completed any of these items, please share that information with your care team to update in your medical record.  Health Maintenance Due   Topic Date Due     ANNUAL REVIEW OF HM ORDERS  Never done     Zoster (Shingles) Vaccine (1 of 2) Never done     AORTIC ANEURYSM SCREENING (SYSTEM ASSIGNED)  Never done     Annual Wellness Visit  07/31/2021     COVID-19 Vaccine (4 - Booster for Moderna series) 02/15/2022     Diabetic Foot Exam  05/13/2022     Flu Vaccine (1) 09/01/2022       Signs of Hearing Loss  Hearing loss is a problem shared by many people. In fact, it's one of the most common health problems, particularly as people age. Most people aged 65 and older have some hearing loss. By age 80, almost everyone does. Hearing loss often occurs slowly over the years. So, you may not realize your hearing has gotten worse.   When sudden hearing loss occurs, it's important to contact your healthcare provider right away. Your provider will do a medical exam and a hearing exam as soon as possible. This is to help find the cause and type of your sudden hearing loss. Based on your diagnosis, your healthcare provider will discuss possible treatments.      Hearing much better with one ear can be a sign of hearing loss.     Have your hearing checked  Call your healthcare provider if you:     Have to strain to hear normal conversation    Have to watch other people s faces very carefully to follow what they re saying    Need to ask people to repeat what they ve said    Often misunderstand what people are saying    Turn the volume of the television or radio up so high that others complain    Feel that people are mumbling when they re talking to you    Find that the effort to hear leaves you feeling tired and irritated    Notice, when  using the phone, that you hear better with one ear than the other  [employer] last reviewed this educational content on 6/1/2022 2000-2022 [employer]. All rights reserved. This information is not intended as a substitute for professional medical care. Always follow your healthcare professional's instructions.

## 2023-03-23 NOTE — PROGRESS NOTES
"    The patient was provided with written information regarding signs of hearing loss.  Answers for HPI/ROS submitted by the patient on 3/23/2023  In general, how would you rate your overall physical health?: good  Frequency of exercise:: 2-3 days/week  Do you usually eat at least 4 servings of fruit and vegetables a day, include whole grains & fiber, and avoid regularly eating high fat or \"junk\" foods? : Yes  Taking medications regularly:: Yes  Medication side effects:: Muscle aches  Activities of Daily Living: no assistance needed  Home safety: no safety concerns identified  Hearing Impairment:: difficulty following a conversation in a noisy restaurant or crowded room  In the past 6 months, have you been bothered by leaking of urine?: No  abdominal pain: No  Blood in stool: No  Blood in urine: No  chest pain: No  chills: No  congestion: No  constipation: Yes  cough: Yes  diarrhea: No  dizziness: No  ear pain: No  eye pain: No  nervous/anxious: No  fever: No  frequency: No  genital sores: No  headaches: Yes  hearing loss: Yes  heartburn: No  arthralgias: No  joint swelling: No  peripheral edema: No  mood changes: No  myalgias: No  nausea: No  dysuria: No  palpitations: No  Skin sensation changes: No  sore throat: Yes  urgency: No  rash: No  shortness of breath: No  visual disturbance: No  weakness: No  impotence: Yes  penile discharge: No  In general, how would you rate your overall mental or emotional health?: good  Duration of exercise:: 30-45 minutes        "

## 2023-11-26 ENCOUNTER — HEALTH MAINTENANCE LETTER (OUTPATIENT)
Age: 73
End: 2023-11-26

## 2024-02-22 ENCOUNTER — PATIENT OUTREACH (OUTPATIENT)
Dept: CARE COORDINATION | Facility: CLINIC | Age: 74
End: 2024-02-22
Payer: MEDICARE

## 2024-03-07 ENCOUNTER — PATIENT OUTREACH (OUTPATIENT)
Dept: CARE COORDINATION | Facility: CLINIC | Age: 74
End: 2024-03-07
Payer: MEDICARE

## 2024-06-01 ENCOUNTER — TRANSFERRED RECORDS (OUTPATIENT)
Dept: MULTI SPECIALTY CLINIC | Facility: CLINIC | Age: 74
End: 2024-06-01

## 2024-06-01 LAB — RETINOPATHY: NORMAL

## 2024-06-23 ENCOUNTER — HEALTH MAINTENANCE LETTER (OUTPATIENT)
Age: 74
End: 2024-06-23

## 2024-11-12 ENCOUNTER — OFFICE VISIT (OUTPATIENT)
Dept: FAMILY MEDICINE | Facility: CLINIC | Age: 74
End: 2024-11-12
Payer: MEDICARE

## 2024-11-12 VITALS — SYSTOLIC BLOOD PRESSURE: 134 MMHG | RESPIRATION RATE: 12 BRPM | HEART RATE: 68 BPM | DIASTOLIC BLOOD PRESSURE: 74 MMHG

## 2024-11-12 DIAGNOSIS — Z28.21 REFUSED INFLUENZA VACCINE: ICD-10-CM

## 2024-11-12 DIAGNOSIS — R00.2 PALPITATIONS: ICD-10-CM

## 2024-11-12 DIAGNOSIS — E11.39 TYPE 2 DIABETES MELLITUS WITH OTHER DIABETIC OPHTHALMIC COMPLICATION (H): ICD-10-CM

## 2024-11-12 DIAGNOSIS — Z00.00 MEDICARE ANNUAL WELLNESS VISIT, SUBSEQUENT: Primary | ICD-10-CM

## 2024-11-12 DIAGNOSIS — E11.9 TYPE 2 DIABETES MELLITUS TREATED WITHOUT INSULIN (H): ICD-10-CM

## 2024-11-12 DIAGNOSIS — I10 ESSENTIAL HYPERTENSION WITH GOAL BLOOD PRESSURE LESS THAN 140/90: ICD-10-CM

## 2024-11-12 DIAGNOSIS — Z23 NEED FOR COVID-19 VACCINE: ICD-10-CM

## 2024-11-12 LAB
ALBUMIN SERPL BCG-MCNC: 4.2 G/DL (ref 3.5–5.2)
ALP SERPL-CCNC: 52 U/L (ref 40–150)
ALT SERPL W P-5'-P-CCNC: 11 U/L (ref 0–70)
ANION GAP SERPL CALCULATED.3IONS-SCNC: 10 MMOL/L (ref 7–15)
AST SERPL W P-5'-P-CCNC: 30 U/L (ref 0–45)
BILIRUB SERPL-MCNC: 0.7 MG/DL
BUN SERPL-MCNC: 13.5 MG/DL (ref 8–23)
CALCIUM SERPL-MCNC: 9.7 MG/DL (ref 8.8–10.4)
CHLORIDE SERPL-SCNC: 98 MMOL/L (ref 98–107)
CHOLEST SERPL-MCNC: 207 MG/DL
CREAT SERPL-MCNC: 1.08 MG/DL (ref 0.67–1.17)
CREAT UR-MCNC: 106 MG/DL
EGFRCR SERPLBLD CKD-EPI 2021: 71 ML/MIN/1.73M2
EST. AVERAGE GLUCOSE BLD GHB EST-MCNC: 169 MG/DL
FASTING STATUS PATIENT QL REPORTED: YES
FASTING STATUS PATIENT QL REPORTED: YES
GLUCOSE SERPL-MCNC: 162 MG/DL (ref 70–99)
HBA1C MFR BLD: 7.5 % (ref 0–5.6)
HCO3 SERPL-SCNC: 24 MMOL/L (ref 22–29)
HDLC SERPL-MCNC: 47 MG/DL
LDLC SERPL CALC-MCNC: 135 MG/DL
MICROALBUMIN UR-MCNC: <12 MG/L
MICROALBUMIN/CREAT UR: NORMAL MG/G{CREAT}
NONHDLC SERPL-MCNC: 160 MG/DL
POTASSIUM SERPL-SCNC: 4.5 MMOL/L (ref 3.4–5.3)
PROT SERPL-MCNC: 7.2 G/DL (ref 6.4–8.3)
SODIUM SERPL-SCNC: 132 MMOL/L (ref 135–145)
TRIGL SERPL-MCNC: 124 MG/DL

## 2024-11-12 PROCEDURE — 90480 ADMN SARSCOV2 VAC 1/ONLY CMP: CPT | Performed by: FAMILY MEDICINE

## 2024-11-12 PROCEDURE — 80053 COMPREHEN METABOLIC PANEL: CPT | Performed by: FAMILY MEDICINE

## 2024-11-12 PROCEDURE — G0439 PPPS, SUBSEQ VISIT: HCPCS | Performed by: FAMILY MEDICINE

## 2024-11-12 PROCEDURE — 36415 COLL VENOUS BLD VENIPUNCTURE: CPT | Performed by: FAMILY MEDICINE

## 2024-11-12 PROCEDURE — 99213 OFFICE O/P EST LOW 20 MIN: CPT | Mod: 25 | Performed by: FAMILY MEDICINE

## 2024-11-12 PROCEDURE — 82570 ASSAY OF URINE CREATININE: CPT | Performed by: FAMILY MEDICINE

## 2024-11-12 PROCEDURE — 80061 LIPID PANEL: CPT | Performed by: FAMILY MEDICINE

## 2024-11-12 PROCEDURE — 91320 SARSCV2 VAC 30MCG TRS-SUC IM: CPT | Performed by: FAMILY MEDICINE

## 2024-11-12 PROCEDURE — 99207 PR FOOT EXAM NO CHARGE: CPT | Performed by: FAMILY MEDICINE

## 2024-11-12 PROCEDURE — 82043 UR ALBUMIN QUANTITATIVE: CPT | Performed by: FAMILY MEDICINE

## 2024-11-12 PROCEDURE — 83036 HEMOGLOBIN GLYCOSYLATED A1C: CPT | Performed by: FAMILY MEDICINE

## 2024-11-12 PROCEDURE — 93000 ELECTROCARDIOGRAM COMPLETE: CPT | Performed by: FAMILY MEDICINE

## 2024-11-12 SDOH — HEALTH STABILITY: PHYSICAL HEALTH: ON AVERAGE, HOW MANY MINUTES DO YOU ENGAGE IN EXERCISE AT THIS LEVEL?: 60 MIN

## 2024-11-12 SDOH — HEALTH STABILITY: PHYSICAL HEALTH: ON AVERAGE, HOW MANY DAYS PER WEEK DO YOU ENGAGE IN MODERATE TO STRENUOUS EXERCISE (LIKE A BRISK WALK)?: 7 DAYS

## 2024-11-12 ASSESSMENT — ENCOUNTER SYMPTOMS: HEADACHES: 1

## 2024-11-12 ASSESSMENT — SOCIAL DETERMINANTS OF HEALTH (SDOH): HOW OFTEN DO YOU GET TOGETHER WITH FRIENDS OR RELATIVES?: TWICE A WEEK

## 2024-11-12 NOTE — PROGRESS NOTES
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 ( Opth)  - HEMOGLOBIN A1C; Future  - Lipid panel reflex to direct LDL Non-fasting; Future  - Albumin Random Urine Quantitative with Creat Ratio; Future  - Comprehensive metabolic panel (BMP + Alb, Alk Phos, ALT, AST, Total. Bili, TP); Future  - OFFICE/OUTPT VISIT,EST,LEVL III  - STATIN NOT PRESCRIBED (INTENTIONAL); Please choose reason not prescribed from choices below.    Essential hypertension with goal blood pressure less than 140/90  Well controlled odf medication   - EKG 12-lead complete w/read - Clinics  - Comprehensive metabolic panel (BMP + Alb, Alk Phos, ALT, AST, Total. Bili, TP); Future  - OFFICE/OUTPT VISIT,EST,LEVL III    Palpitations  At HS   No other symptoms   Follow up only if unimproved.   - EKG 12-lead complete w/read - Clinics  - OFFICE/OUTPT VISIT,EST,LEVL III    Need for COVID-19 vaccine  done  - COVID-19 mRNA vaccine 12+y (COMIRNATY (PFIZER)) injection 30 mcg    Type 2 diabetes mellitus treated without insulin (H)  Appears Well controlled   - HEMOGLOBIN A1C; Future  - Lipid panel reflex to direct LDL Non-fasting; Future  - Albumin Random Urine Quantitative with Creat Ratio; Future  - STATIN NOT PRESCRIBED (INTENTIONAL); Please choose reason not prescribed from choices below.  He decliens statin          Counseling  Appropriate preventive services were addressed with this patient via screening, questionnaire, or discussion as appropriate for fall prevention, nutrition, physical activity,  Tobacco-use cessation, social engagement, weight loss and cognition.  Checklist reviewing preventive services available has been given to the patient.  Reviewed patient's diet, addressing concerns and/or questions.   The patient was instructed to see the dentist every 6 months.   Patient reported safety concerns were addressed today.The patient was provided with written information regarding signs of hearing loss.       Regular exercise  See Patient Instructions    Subjective   Alec is a 74 year old, presenting for the following:  Wellness Visit and Headache (Throbbing dull headache at night)        11/12/2024     9:24 AM   Additional Questions   Roomed by Lilia Cuadra           Headache       Notes sensitive of palaitiona at [facility] / head/ chest   No other symptoms     Diabetes Follow-up    How often are you checking your blood sugar? Not at all  What concerns do you have today about your diabetes? None   Do you have any of these symptoms? (Select all that apply)  No numbness or tingling in feet.  No redness, sores or blisters on feet.  No complaints of excessive thirst.  No reports of blurry vision.  No significant changes to weight.          Hyperlipidemia Follow-Up    Are you regularly taking any medication or supplement to lower your cholesterol?   No  Are you having muscle aches or other side effects that you think could be caused by your cholesterol lowering medication?  No    Hypertension Follow-up    Do you check your blood pressure regularly outside of the clinic? Yes   Are you following a low salt diet? Yes  Are your blood pressures ever more than 140 on the top number (systolic) OR more   than 90 on the bottom number (diastolic), for example 140/90? No    BP Readings from Last 2 Encounters:   11/12/24 134/74   03/23/23 138/76     Hemoglobin A1C (%)   Date Value   03/23/2023 6.9 (H)   10/31/2022 7.1 (H)   05/13/2021 7.0 (H)   07/31/2020 6.4 (H)     LDL Cholesterol Calculated (mg/dL)   Date  Value   03/23/2023 174 (H)   11/07/2022 139 (H)   05/13/2021 172 (H)   07/31/2020 133 (H)       Health Care Directive  Patient does not have a Health Care Directive: Discussed advance care planning with patient; however, patient declined at this time.      11/12/2024   General Health   How would you rate your overall physical health? Good   Feel stress (tense, anxious, or unable to sleep) Not at all            11/12/2024   Nutrition   Diet: Diabetic            11/12/2024   Exercise   Days per week of moderate/strenous exercise 7 days   Average minutes spent exercising at this level 60 min            11/12/2024   Social Factors   Frequency of gathering with friends or relatives Twice a week   Worry food won't last until get money to buy more No   Food not last or not have enough money for food? No   Do you have housing? (Housing is defined as stable permanent housing and does not include staying ouside in a car, in a tent, in an abandoned building, in an overnight shelter, or couch-surfing.) Yes   Are you worried about losing your housing? No   Lack of transportation? No   Unable to get utilities (heat,electricity)? No            11/12/2024   Fall Risk   Fallen 2 or more times in the past year? No     No    Trouble with walking or balance? No     No        Patient-reported    Multiple values from one day are sorted in reverse-chronological order          11/12/2024   Activities of Daily Living- Home Safety   Needs help with the following daily activites None of the above   Safety concerns in the home No grab bars in the bathroom            11/12/2024   Dental   Dentist two times every year? (!) NO            11/12/2024   Hearing Screening   Hearing concerns? (!) I FEEL THAT PEOPLE ARE MUMBLING OR NOT SPEAKING CLEARLY.    (!) I NEED TO ASK PEOPLE TO SPEAK UP OR REPEAT THEMSELVES.    (!) IT'S HARDER TO UNDERSTAND WOMEN'S VOICES THAN MEN'S VOICES.    (!) IT'S HARD TO FOLLOW A CONVERSATION IN A NOISY RESTAURANT OR  CROWDED ROOM.    (!) TROUBLE UNDERSTANDING SOFT OR WHISPERED SPEECH.       Multiple values from one day are sorted in reverse-chronological order         11/12/2024   Driving Risk Screening   Patient/family members have concerns about driving No            11/12/2024   General Alertness/Fatigue Screening   Have you been more tired than usual lately? No            11/12/2024   Urinary Incontinence Screening   Bothered by leaking urine in past 6 months No               Today's PHQ-2 Score:       11/12/2024     9:41 AM   PHQ-2 ( 1999 [employer])   Q1: Little interest or pleasure in doing things 0    Q2: Feeling down, depressed or hopeless 0    PHQ-2 Score 0    Q1: Little interest or pleasure in doing things Not at all   Q2: Feeling down, depressed or hopeless Not at all   PHQ-2 Score 0       Patient-reported           11/12/2024   Substance Use   Alcohol more than 3/day or more than 7/wk No   Do you have a current opioid prescription? No   How severe/bad is pain from 1 to 10? 0/10 (No Pain)   Do you use any other substances recreationally? No        Social History     Tobacco Use    Smoking status: Never    Smokeless tobacco: Never   Vaping Use    Vaping status: Never Used   Substance Use Topics    Alcohol use: Yes     Comment: occasional glass of wine    Drug use: No       ASCVD Risk   The 10-year ASCVD risk score (Chevy SINGH, et al., 2019) is: 28.7%    Values used to calculate the score:      Age: 74 years      Sex: Male      Is Non- : Yes      Diabetic: Yes      Tobacco smoker: No      Systolic Blood Pressure: 134 mmHg      Is BP treated: No      HDL Cholesterol: 51 mg/dL      Total Cholesterol: 252 mg/dL            Reviewed and updated as needed this visit by Provider                    Past Medical History:   Diagnosis Date    Controlled type 2 diabetes mellitus without complication, without long-term current use of insulin (H) 10/3/2017     Current providers sharing in care for this  "patient include:  Patient Care Team:  Riaz Torres MD as PCP - General (Family Practice)  Yael Hanks MD as MD (Ophthalmology)  Riaz Torres MD as MD (Family Practice)  Aravind Gloria MD as MD (Neurology)  Riaz Torres MD as Assigned PCP    The following health maintenance items are reviewed in Epic and correct as of today:  Health Maintenance   Topic Date Due    ZOSTER IMMUNIZATION (1 of 2) Never done    RSV VACCINE (1 - 1-dose 75+ series) Never done    A1C  09/23/2023    BMP  03/23/2024    LIPID  03/23/2024    MICROALBUMIN  03/23/2024    DTAP/TDAP/TD IMMUNIZATION (2 - Td or Tdap) 07/31/2024    INFLUENZA VACCINE (1) 09/01/2024    COVID-19 Vaccine (4 - 2024-25 season) 09/01/2024    EYE EXAM  01/03/2025    MEDICARE ANNUAL WELLNESS VISIT  11/12/2025    DIABETIC FOOT EXAM  11/12/2025    ANNUAL REVIEW OF HM ORDERS  11/12/2025    FALL RISK ASSESSMENT  11/12/2025    ADVANCE CARE PLANNING  03/23/2028    HEPATITIS C SCREENING  Completed    PHQ-2 (once per calendar year)  Completed    Pneumococcal Vaccine: 65+ Years  Completed    HPV IMMUNIZATION  Aged Out    MENINGITIS IMMUNIZATION  Aged Out    RSV MONOCLONAL ANTIBODY  Aged Out    COLORECTAL CANCER SCREENING  Discontinued         Review of Systems  Constitutional, HEENT, cardiovascular, pulmonary, gi and gu systems are negative, except as otherwise noted.     Objective    Exam  /74   Pulse 68   Resp 12    Estimated body mass index is 23.35 kg/m  as calculated from the following:    Height as of 3/23/23: 1.704 m (5' 7.09\").    Weight as of 3/23/23: 67.8 kg (149 lb 8 oz).    Physical Exam  GENERAL: alert and no distress  EYES: Eyes grossly normal to inspection, PERRL and conjunctivae and sclerae normal  HENT: ear canals and TM's normal, nose and mouth without ulcers or lesions  NECK: no adenopathy, no asymmetry, masses, or scars  RESP: lungs clear to auscultation - no rales, rhonchi or wheezes  CV: regular " rate and rhythm, normal S1 S2, no S3 or S4, no murmur, click or rub, no peripheral edema  ABDOMEN: soft, nontender, no hepatosplenomegaly, no masses and bowel sounds normal  MS: no gross musculoskeletal defects noted, no edema  SKIN: no suspicious lesions or rashes  NEURO: Normal strength and tone, mentation intact and speech normal  PSYCH: mentation appears normal, affect normal/bright  LYMPH: no cervical, supraclavicular, axillary, or inguinal adenopathy  Diabetic foot exam: normal DP and PT pulses, no trophic changes or ulcerative lesions, and normal sensory exam        11/12/2024   Mini Cog   Clock Draw Score 2 Normal   3 Item Recall 1 object recalled   Mini Cog Total Score 3                 Signed Electronically by: Riaz Torres MD

## 2024-11-22 ENCOUNTER — TELEPHONE (OUTPATIENT)
Dept: FAMILY MEDICINE | Facility: CLINIC | Age: 74
End: 2024-11-22

## 2025-05-12 ENCOUNTER — OFFICE VISIT (OUTPATIENT)
Dept: FAMILY MEDICINE | Facility: CLINIC | Age: 75
End: 2025-05-12
Payer: MEDICARE

## 2025-05-12 VITALS
SYSTOLIC BLOOD PRESSURE: 142 MMHG | HEIGHT: 67 IN | DIASTOLIC BLOOD PRESSURE: 74 MMHG | WEIGHT: 139.5 LBS | TEMPERATURE: 97.9 F | BODY MASS INDEX: 21.9 KG/M2 | RESPIRATION RATE: 17 BRPM | HEART RATE: 67 BPM | OXYGEN SATURATION: 100 %

## 2025-05-12 DIAGNOSIS — Z53.20 REFUSAL OF STATIN MEDICATION BY PATIENT: ICD-10-CM

## 2025-05-12 DIAGNOSIS — R53.83 FATIGUE, UNSPECIFIED TYPE: ICD-10-CM

## 2025-05-12 DIAGNOSIS — E11.39 TYPE 2 DIABETES MELLITUS WITH OTHER DIABETIC OPHTHALMIC COMPLICATION (H): Primary | ICD-10-CM

## 2025-05-12 DIAGNOSIS — R00.2 PALPITATIONS: ICD-10-CM

## 2025-05-12 DIAGNOSIS — I10 ESSENTIAL HYPERTENSION WITH GOAL BLOOD PRESSURE LESS THAN 140/90: ICD-10-CM

## 2025-05-12 LAB
ERYTHROCYTE [DISTWIDTH] IN BLOOD BY AUTOMATED COUNT: 12.4 % (ref 10–15)
EST. AVERAGE GLUCOSE BLD GHB EST-MCNC: 214 MG/DL
HBA1C MFR BLD: 9.1 % (ref 0–5.6)
HCT VFR BLD AUTO: 35.1 % (ref 40–53)
HGB BLD-MCNC: 12.4 G/DL (ref 13.3–17.7)
MCH RBC QN AUTO: 31.6 PG (ref 26.5–33)
MCHC RBC AUTO-ENTMCNC: 35.3 G/DL (ref 31.5–36.5)
MCV RBC AUTO: 89 FL (ref 78–100)
PLATELET # BLD AUTO: 219 10E3/UL (ref 150–450)
RBC # BLD AUTO: 3.93 10E6/UL (ref 4.4–5.9)
WBC # BLD AUTO: 5.5 10E3/UL (ref 4–11)

## 2025-05-12 PROCEDURE — 80053 COMPREHEN METABOLIC PANEL: CPT | Performed by: FAMILY MEDICINE

## 2025-05-12 PROCEDURE — 93000 ELECTROCARDIOGRAM COMPLETE: CPT | Performed by: FAMILY MEDICINE

## 2025-05-12 PROCEDURE — 36415 COLL VENOUS BLD VENIPUNCTURE: CPT | Performed by: FAMILY MEDICINE

## 2025-05-12 PROCEDURE — 85027 COMPLETE CBC AUTOMATED: CPT | Performed by: FAMILY MEDICINE

## 2025-05-12 PROCEDURE — 3078F DIAST BP <80 MM HG: CPT | Performed by: FAMILY MEDICINE

## 2025-05-12 PROCEDURE — 99214 OFFICE O/P EST MOD 30 MIN: CPT | Performed by: FAMILY MEDICINE

## 2025-05-12 PROCEDURE — 3077F SYST BP >= 140 MM HG: CPT | Performed by: FAMILY MEDICINE

## 2025-05-12 PROCEDURE — 83036 HEMOGLOBIN GLYCOSYLATED A1C: CPT | Performed by: FAMILY MEDICINE

## 2025-05-12 PROCEDURE — 1126F AMNT PAIN NOTED NONE PRSNT: CPT | Performed by: FAMILY MEDICINE

## 2025-05-12 ASSESSMENT — ENCOUNTER SYMPTOMS: FATIGUE: 1

## 2025-05-12 ASSESSMENT — PAIN SCALES - GENERAL: PAINLEVEL_OUTOF10: NO PAIN (0)

## 2025-05-12 NOTE — PROGRESS NOTES
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   - HEMOGLOBIN A1C; Future  - Comprehensive metabolic panel (BMP + Alb, Alk Phos, ALT, AST, Total. Bili, TP); Future  - HEMOGLOBIN A1C  - Comprehensive metabolic panel (BMP + Alb, Alk Phos, ALT, AST, Total. Bili, TP)    Palpitations  normal exam/ EKG  - EKG 12-lead complete w/read - Clinics    Fatigue, unspecified type  Likely due to recent viral URI and poorly controlled DIABETES MELLITUS   - CBC with platelets; Future  - CBC with platelets    Essential hypertension with goal blood pressure less than 140/90  Nor Well controlled  again offer lisinopril ( he has declined this in the past)  - EKG 12-lead complete w/read - Clinics  - Comprehensive metabolic panel (BMP + Alb, Alk Phos, ALT, AST, Total. Bili, TP); Future  - Comprehensive metabolic panel (BMP + Alb, Alk Phos, ALT, AST, Total. Bili, TP)    Refusal of statin medication by patient  Re offer it            Follow-up    Follow-up Visit   Expected date:  Aug 12, 2025 (Approximate)      Follow Up Appointment Details:     Follow-up with whom?: PCP    Follow-Up for what?: Chronic Disease f/u    Chronic Disease f/u:  Diabetes  Hyperlipidemia  Hypertension       How?: In Person    Is this an as-needed follow-up?: No                 Subjective   Alec is a 74 year old, presenting for the following health issues:  Fatigue and Sleep Problem        5/12/2025     1:48 PM   Additional Questions   Roomed by Cheryl CONNOLLY     Via the Health Maintenance questionnaire, the patient has reported the following services have been completed -Eye Exam: [facility] 2024-06-01, this information has been sent to the abstraction team.  Fatigue  Associated symptoms include fatigue.   History of Present Illness       Reason for visit:  I feel tired    He eats 2-3 servings of fruits and  "vegetables daily.He exercises with enough effort to increase his heart rate 20 to 29 minutes per day.  He is missing 3 dose(s) of medications per week.          Diabetes Follow-up    How often are you checking your blood sugar? Not at all  What concerns do you have today about your diabetes? None and Other: fatigue   Do you have any of these symptoms? (Select all that apply)  No numbness or tingling in feet.  No redness, sores or blisters on feet.  No complaints of excessive thirst.  No reports of blurry vision.  No significant changes to weight.          Hyperlipidemia Follow-Up    Are you regularly taking any medication or supplement to lower your cholesterol?   No  Are you having muscle aches or other side effects that you think could be caused by your cholesterol lowering medication?  No    Hypertension Follow-up    Do you check your blood pressure regularly outside of the clinic? No   Are you following a low salt diet? Yes  Are your blood pressures ever more than 140 on the top number (systolic) OR more   than 90 on the bottom number (diastolic), for example 140/90? N/A    BP Readings from Last 2 Encounters:   05/12/25 (!) 142/74   11/12/24 134/74     Hemoglobin A1C (%)   Date Value   05/12/2025 9.1 (H)   11/12/2024 7.5 (H)   05/13/2021 7.0 (H)   07/31/2020 6.4 (H)     LDL Cholesterol Calculated (mg/dL)   Date Value   11/12/2024 135 (H)   03/23/2023 174 (H)   05/13/2021 172 (H)   07/31/2020 133 (H)       Had rcent URI \" getting better:\"      Review of Systems  Constitutional, HEENT, cardiovascular, pulmonary, gi and gu systems are negative, except as otherwise noted.      Objective    BP (!) 142/74   Pulse 67   Temp 97.9  F (36.6  C) (Temporal)   Resp 17   Ht 1.7 m (5' 6.93\")   Wt 63.3 kg (139 lb 8 oz)   SpO2 100%   BMI 21.90 kg/m    Body mass index is 21.9 kg/m .  Physical Exam   GENERAL: alert and fatigued  EYES: Eyes grossly normal to inspection, PERRL and conjunctivae and sclerae normal  HENT: ear " canals and TM's normal, nose and mouth without ulcers or lesions  NECK: no adenopathy, no asymmetry, masses, or scars, and thyroid normal to palpation  RESP: lungs clear to auscultation - no rales, rhonchi or wheezes  CV: regular rate and rhythm, normal S1 S2, no S3 or S4, no murmur, click or rub, no peripheral edema  ABDOMEN: soft, nontender, no hepatosplenomegaly, no masses and bowel sounds normal  MS: no gross musculoskeletal defects noted, no edema  SKIN: no suspicious lesions or rashes  NEURO: Normal strength and tone, mentation intact and speech normal  PSYCH: mentation appears normal, affect normal/bright  Diabetic foot exam: normal DP and PT pulses, no trophic changes or ulcerative lesions, and normal sensory exam    Office Visit on 11/12/2024   Component Date Value Ref Range Status    Estimated Average Glucose 11/12/2024 169 (H)  <117 mg/dL Final    Hemoglobin A1C 11/12/2024 7.5 (H)  0.0 - 5.6 % Final    Normal <5.7%   Prediabetes 5.7-6.4%    Diabetes 6.5% or higher     Note: Adopted from ADA consensus guidelines.    Cholesterol 11/12/2024 207 (H)  <200 mg/dL Final    Triglycerides 11/12/2024 124  <150 mg/dL Final    Direct Measure HDL 11/12/2024 47  >=40 mg/dL Final    LDL Cholesterol Calculated 11/12/2024 135 (H)  <100 mg/dL Final    Non HDL Cholesterol 11/12/2024 160 (H)  <130 mg/dL Final    Patient Fasting > 8hrs? 11/12/2024 Yes   Final    Creatinine Urine mg/dL 11/12/2024 106.0  mg/dL Final    The reference ranges have not been established in urine creatinine. The results should be integrated into the clinical context for interpretation.    Albumin Urine mg/L 11/12/2024 <12.0  mg/L Final    The reference ranges have not been established in urine albumin. The results should be integrated into the clinical context for interpretation.    Albumin Urine mg/g Cr 11/12/2024    Final    Unable to calculate, urine albumin and/or urine creatinine is outside detectable limits.  Microalbuminuria is defined as an  albumin:creatinine ratio of 17 to 299 for males and 25 to 299 for females. A ratio of albumin:creatinine of 300 or higher is indicative of overt proteinuria.  Due to biologic variability, positive results should be confirmed by a second, first-morning random or 24-hour timed urine specimen. If there is discrepancy, a third specimen is recommended. When 2 out of 3 results are in the microalbuminuria range, this is evidence for incipient nephropathy and warrants increased efforts at glucose control, blood pressure control, and institution of therapy with an angiotensin-converting-enzyme (ACE) inhibitor (if the patient can tolerate it).      Sodium 11/12/2024 132 (L)  135 - 145 mmol/L Final    Potassium 11/12/2024 4.5  3.4 - 5.3 mmol/L Final    Carbon Dioxide (CO2) 11/12/2024 24  22 - 29 mmol/L Final    Anion Gap 11/12/2024 10  7 - 15 mmol/L Final    Urea Nitrogen 11/12/2024 13.5  8.0 - 23.0 mg/dL Final    Creatinine 11/12/2024 1.08  0.67 - 1.17 mg/dL Final    GFR Estimate 11/12/2024 71  >60 mL/min/1.73m2 Final    eGFR calculated using 2021 CKD-EPI equation.    Calcium 11/12/2024 9.7  8.8 - 10.4 mg/dL Final    Reference intervals for this test were updated on 7/16/2024 to reflect our healthy population more accurately. There may be differences in the flagging of prior results with similar values performed with this method. Those prior results can be interpreted in the context of the updated reference intervals.    Chloride 11/12/2024 98  98 - 107 mmol/L Final    Glucose 11/12/2024 162 (H)  70 - 99 mg/dL Final    Alkaline Phosphatase 11/12/2024 52  40 - 150 U/L Final    AST 11/12/2024 30  0 - 45 U/L Final    ALT 11/12/2024 11  0 - 70 U/L Final    Protein Total 11/12/2024 7.2  6.4 - 8.3 g/dL Final    Albumin 11/12/2024 4.2  3.5 - 5.2 g/dL Final    Bilirubin Total 11/12/2024 0.7  <=1.2 mg/dL Final    Patient Fasting > 8hrs? 11/12/2024 Yes   Final     Results for orders placed or performed in visit on 05/12/25   HEMOGLOBIN  A1C     Status: Abnormal   Result Value Ref Range    Estimated Average Glucose 214 (H) <117 mg/dL    Hemoglobin A1C 9.1 (H) 0.0 - 5.6 %    Narrative    Results confirmed by repeat test.    CBC with platelets     Status: Abnormal   Result Value Ref Range    WBC Count 5.5 4.0 - 11.0 10e3/uL    RBC Count 3.93 (L) 4.40 - 5.90 10e6/uL    Hemoglobin 12.4 (L) 13.3 - 17.7 g/dL    Hematocrit 35.1 (L) 40.0 - 53.0 %    MCV 89 78 - 100 fL    MCH 31.6 26.5 - 33.0 pg    MCHC 35.3 31.5 - 36.5 g/dL    RDW 12.4 10.0 - 15.0 %    Platelet Count 219 150 - 450 10e3/uL           Signed Electronically by: Riaz Torres MD

## 2025-05-13 LAB
ALBUMIN SERPL BCG-MCNC: 4.4 G/DL (ref 3.5–5.2)
ALP SERPL-CCNC: 59 U/L (ref 40–150)
ALT SERPL W P-5'-P-CCNC: 17 U/L (ref 0–70)
ANION GAP SERPL CALCULATED.3IONS-SCNC: 12 MMOL/L (ref 7–15)
AST SERPL W P-5'-P-CCNC: 23 U/L (ref 0–45)
BILIRUB SERPL-MCNC: 0.5 MG/DL
BUN SERPL-MCNC: 22.4 MG/DL (ref 8–23)
CALCIUM SERPL-MCNC: 9.7 MG/DL (ref 8.8–10.4)
CHLORIDE SERPL-SCNC: 101 MMOL/L (ref 98–107)
CREAT SERPL-MCNC: 1.06 MG/DL (ref 0.67–1.17)
EGFRCR SERPLBLD CKD-EPI 2021: 73 ML/MIN/1.73M2
GLUCOSE SERPL-MCNC: 230 MG/DL (ref 70–99)
HCO3 SERPL-SCNC: 23 MMOL/L (ref 22–29)
POTASSIUM SERPL-SCNC: 4.2 MMOL/L (ref 3.4–5.3)
PROT SERPL-MCNC: 7.2 G/DL (ref 6.4–8.3)
SODIUM SERPL-SCNC: 136 MMOL/L (ref 135–145)

## 2025-05-14 ENCOUNTER — RESULTS FOLLOW-UP (OUTPATIENT)
Dept: FAMILY MEDICINE | Facility: CLINIC | Age: 75
End: 2025-05-14

## 2025-05-14 DIAGNOSIS — E11.39 TYPE 2 DIABETES MELLITUS WITH OTHER DIABETIC OPHTHALMIC COMPLICATION (H): Primary | ICD-10-CM

## 2025-05-14 RX ORDER — METFORMIN HYDROCHLORIDE 500 MG/1
500 TABLET, EXTENDED RELEASE ORAL 2 TIMES DAILY WITH MEALS
Qty: 180 TABLET | Refills: 3 | Status: SHIPPED | OUTPATIENT
Start: 2025-05-14 | End: 2026-05-09

## 2025-05-14 RX ORDER — LISINOPRIL 10 MG/1
10 TABLET ORAL DAILY
Qty: 90 TABLET | Refills: 3 | Status: SHIPPED | OUTPATIENT
Start: 2025-05-14 | End: 2026-05-09

## 2025-05-14 NOTE — TELEPHONE ENCOUNTER
Please call patient       Why he feel so tired is because   1_ his DIABETES MELLITUS is out of control ( A1C above 9)  Start metformin   Work on diet   See Radha ( KELLY)   Follow up in 3 months.      2) BP elevated start lisinopril 10 mg    Walk daily   Follow up in 3 months.

## 2025-06-03 ENCOUNTER — TELEPHONE (OUTPATIENT)
Dept: FAMILY MEDICINE | Facility: CLINIC | Age: 75
End: 2025-06-03
Payer: MEDICARE

## 2025-06-03 NOTE — TELEPHONE ENCOUNTER
KELLY referral from: [facility] visit (referral by provider)    MTM referral outreach attempt #2 on Radha 3, 2025 at 1:39 PM      Outcome: Patient not reachable after several attempts, sent [employer] message    Use vbc for the carrier/Plan on the flowsheet      [employer] Message Sent    KELLY Tate   [phone]

## 2025-07-14 ENCOUNTER — PATIENT OUTREACH (OUTPATIENT)
Dept: CARE COORDINATION | Facility: CLINIC | Age: 75
End: 2025-07-14
Payer: MEDICARE